# Patient Record
Sex: MALE | Race: WHITE | NOT HISPANIC OR LATINO | Employment: OTHER | RURAL
[De-identification: names, ages, dates, MRNs, and addresses within clinical notes are randomized per-mention and may not be internally consistent; named-entity substitution may affect disease eponyms.]

---

## 2023-02-24 ENCOUNTER — OFFICE VISIT (OUTPATIENT)
Dept: FAMILY MEDICINE CLINIC | Facility: CLINIC | Age: 73
End: 2023-02-24
Payer: MEDICARE

## 2023-02-24 VITALS
RESPIRATION RATE: 18 BRPM | HEIGHT: 67 IN | SYSTOLIC BLOOD PRESSURE: 160 MMHG | DIASTOLIC BLOOD PRESSURE: 98 MMHG | OXYGEN SATURATION: 96 % | BODY MASS INDEX: 32.22 KG/M2 | HEART RATE: 78 BPM | WEIGHT: 205.3 LBS

## 2023-02-24 DIAGNOSIS — E78.2 MIXED HYPERLIPIDEMIA: ICD-10-CM

## 2023-02-24 DIAGNOSIS — G25.0 BENIGN ESSENTIAL TREMOR: ICD-10-CM

## 2023-02-24 DIAGNOSIS — Z12.5 PROSTATE CANCER SCREENING: ICD-10-CM

## 2023-02-24 DIAGNOSIS — R53.83 OTHER FATIGUE: ICD-10-CM

## 2023-02-24 DIAGNOSIS — R73.9 BLOOD GLUCOSE ELEVATED: ICD-10-CM

## 2023-02-24 DIAGNOSIS — Z79.899 ENCOUNTER FOR LONG-TERM (CURRENT) USE OF OTHER MEDICATIONS: ICD-10-CM

## 2023-02-24 DIAGNOSIS — M19.041 PRIMARY OSTEOARTHRITIS OF BOTH HANDS: ICD-10-CM

## 2023-02-24 DIAGNOSIS — M19.042 PRIMARY OSTEOARTHRITIS OF BOTH HANDS: ICD-10-CM

## 2023-02-24 DIAGNOSIS — I10 ESSENTIAL HYPERTENSION, BENIGN: Primary | ICD-10-CM

## 2023-02-24 DIAGNOSIS — Z12.11 COLON CANCER SCREENING: ICD-10-CM

## 2023-02-24 DIAGNOSIS — G56.03 BILATERAL CARPAL TUNNEL SYNDROME: ICD-10-CM

## 2023-02-24 DIAGNOSIS — L82.1 SEBORRHEIC KERATOSIS: ICD-10-CM

## 2023-02-24 PROCEDURE — 36415 COLL VENOUS BLD VENIPUNCTURE: CPT | Performed by: FAMILY MEDICINE

## 2023-02-24 PROCEDURE — 99204 OFFICE O/P NEW MOD 45 MIN: CPT | Performed by: FAMILY MEDICINE

## 2023-02-25 DIAGNOSIS — R97.20 ELEVATED PSA: Primary | ICD-10-CM

## 2023-02-25 PROBLEM — L82.1 SEBORRHEIC KERATOSIS: Status: ACTIVE | Noted: 2023-02-25

## 2023-02-25 PROBLEM — G25.0 BENIGN ESSENTIAL TREMOR: Status: ACTIVE | Noted: 2023-02-25

## 2023-02-25 LAB
ALBUMIN SERPL-MCNC: 4.5 G/DL (ref 3.7–4.7)
ALBUMIN/GLOB SERPL: 1.5 {RATIO} (ref 1.2–2.2)
ALP SERPL-CCNC: 101 IU/L (ref 44–121)
ALT SERPL-CCNC: 23 IU/L (ref 0–44)
AST SERPL-CCNC: 26 IU/L (ref 0–40)
BASOPHILS # BLD AUTO: 0.1 X10E3/UL (ref 0–0.2)
BASOPHILS NFR BLD AUTO: 1 %
BILIRUB SERPL-MCNC: 0.5 MG/DL (ref 0–1.2)
BUN SERPL-MCNC: 17 MG/DL (ref 8–27)
BUN/CREAT SERPL: 16 (ref 10–24)
CALCIUM SERPL-MCNC: 9.5 MG/DL (ref 8.6–10.2)
CHLORIDE SERPL-SCNC: 102 MMOL/L (ref 96–106)
CHOLEST SERPL-MCNC: 213 MG/DL (ref 100–199)
CO2 SERPL-SCNC: 21 MMOL/L (ref 20–29)
CREAT SERPL-MCNC: 1.06 MG/DL (ref 0.76–1.27)
EGFRCR SERPLBLD CKD-EPI 2021: 74 ML/MIN/1.73
EOSINOPHIL # BLD AUTO: 0.2 X10E3/UL (ref 0–0.4)
EOSINOPHIL NFR BLD AUTO: 3 %
ERYTHROCYTE [DISTWIDTH] IN BLOOD BY AUTOMATED COUNT: 12.9 % (ref 11.6–15.4)
GLOBULIN SER CALC-MCNC: 3.1 G/DL (ref 1.5–4.5)
GLUCOSE SERPL-MCNC: 111 MG/DL (ref 70–99)
HBA1C MFR BLD: 5.8 % (ref 4.8–5.6)
HCT VFR BLD AUTO: 50.2 % (ref 37.5–51)
HDLC SERPL-MCNC: 51 MG/DL
HGB BLD-MCNC: 17.5 G/DL (ref 13–17.7)
IMM GRANULOCYTES # BLD AUTO: 0 X10E3/UL (ref 0–0.1)
IMM GRANULOCYTES NFR BLD AUTO: 0 %
LDLC SERPL CALC-MCNC: 129 MG/DL (ref 0–99)
LYMPHOCYTES # BLD AUTO: 2.4 X10E3/UL (ref 0.7–3.1)
LYMPHOCYTES NFR BLD AUTO: 32 %
MCH RBC QN AUTO: 30.6 PG (ref 26.6–33)
MCHC RBC AUTO-ENTMCNC: 34.9 G/DL (ref 31.5–35.7)
MCV RBC AUTO: 88 FL (ref 79–97)
MONOCYTES # BLD AUTO: 0.6 X10E3/UL (ref 0.1–0.9)
MONOCYTES NFR BLD AUTO: 8 %
NEUTROPHILS # BLD AUTO: 4.2 X10E3/UL (ref 1.4–7)
NEUTROPHILS NFR BLD AUTO: 56 %
PLATELET # BLD AUTO: 291 X10E3/UL (ref 150–450)
POTASSIUM SERPL-SCNC: 4.7 MMOL/L (ref 3.5–5.2)
PROT SERPL-MCNC: 7.6 G/DL (ref 6–8.5)
PSA SERPL-MCNC: 27.8 NG/ML (ref 0–4)
RBC # BLD AUTO: 5.72 X10E6/UL (ref 4.14–5.8)
SODIUM SERPL-SCNC: 140 MMOL/L (ref 134–144)
T4 FREE SERPL-MCNC: 1.03 NG/DL (ref 0.82–1.77)
TRIGL SERPL-MCNC: 187 MG/DL (ref 0–149)
TSH SERPL DL<=0.005 MIU/L-ACNC: 3.54 UIU/ML (ref 0.45–4.5)
VLDLC SERPL CALC-MCNC: 33 MG/DL (ref 5–40)
WBC # BLD AUTO: 7.6 X10E3/UL (ref 3.4–10.8)

## 2023-02-26 NOTE — PROGRESS NOTES
Chief Complaint  Establish Care    Subjective      Carlos Simms presents to [facility]  History of Present Illness  Patient is here to establish care and discuss a few concerns that he has.  He said he has not been to see a physician or had a physical in many years.  He moved here from [place] several years ago.  He worked as a respiratory therapist for 48 years.  Since retiring he has continued to be very physically active, enjoying doing construction projects around the home, cycling, and bowling, among other things.  He states he does have a history of essential hypertension, but typically has controlled this with lifestyle modification alone, but admits that he was a bit more sedentary in the colder months and 8 with a little less discretion, and therefore has gained some weight.  He does have some tremor in his hands, mostly when he tries to do something with his hands such as eating or writing, or using utensils.  He has been told by doctors in the past including the neurologist that it was benign essential tremor, but he just wanted to make sure that nothing new was going on.  He does have some atrophy of the muscle at the base of his right thumb, and says that he had trigger thumb surgery on that side many years ago, but the atrophy is only developed in the last year or 2, and the surgery was many years ago.  However, he does have chronic carpal tunnel syndrome symptoms in both hands with some numbness in the fingers, loss of sensation of fingertips, and mild loss of , but not enough that it really impedes his activities.  He does have some osteoarthritis in his hands.  Other than that he has several flat moles on his back that he wants me to check, since he cannot see those.  He does not have any worrisome skin lesions anywhere on the front part of his body, and says that the moles, as he calls them, do not itch bleed or hurt, is just that he cannot see them so he  "wants them checked.  Objective   Vital Signs:   Vitals:    02/24/23 1455 02/24/23 1612   BP: 170/100 160/98   BP Location:  Left arm   Patient Position:  Sitting   Cuff Size:  Adult   Pulse: 78    Resp: 18    SpO2: 96%    Weight: 93.1 kg (205 lb 4.8 oz)    Height: 170.2 cm (67\")       /98 (BP Location: Left arm, Patient Position: Sitting, Cuff Size: Adult)   Pulse 78   Resp 18   Ht 170.2 cm (67\")   Wt 93.1 kg (205 lb 4.8 oz)   SpO2 96%   BMI 32.15 kg/m²     Body mass index is 32.15 kg/m².    Review of Systems   Constitutional: Negative for activity change, appetite change, chills, fever, unexpected weight gain and unexpected weight loss.   HENT: Negative for ear discharge, ear pain, mouth sores, nosebleeds, rhinorrhea, sinus pressure, sore throat, swollen glands, trouble swallowing and voice change.    Eyes: Negative for blurred vision, double vision, pain, redness and visual disturbance.   Respiratory: Negative for cough, chest tightness, shortness of breath and wheezing.    Cardiovascular: Negative for chest pain, palpitations and leg swelling.        PND, orthopnea   Gastrointestinal: Negative for abdominal distention, abdominal pain, blood in stool, constipation, diarrhea, nausea, vomiting and GERD.        Dysphagia, odynophagia   Endocrine: Negative for polydipsia, polyphagia and polyuria.   Genitourinary: Positive for nocturia. Negative for dysuria, hematuria, urgency and urinary incontinence.   Musculoskeletal: Positive for arthralgias (Mild arthritis symptoms in hands). Negative for back pain, gait problem, joint swelling, myalgias and neck pain.   Skin: Negative for rash, skin lesions (worrisome/suspicious), wound and bruise.   Allergic/Immunologic: Negative for food allergies.   Neurological: Positive for tremors (Benign essential tremor symptoms, intention tremor) and numbness (Carpal tunnel syndrome symptoms in both hands, chronic). Negative for dizziness, seizures, syncope, facial asymmetry, " speech difficulty, weakness, light-headedness, headache and memory problem.   Hematological: Negative for adenopathy. Does not bruise/bleed easily.   Psychiatric/Behavioral: Negative for sleep disturbance, suicidal ideas and depressed mood. The patient is not nervous/anxious.        Past History:  Medical History: has a past medical history of Benign essential tremor, Carpal tunnel syndrome on both sides, Essential hypertension, and Osteoarthritis.   Surgical History: has a past surgical history that includes Vasectomy; Vasectomy reversal; and Trigger finger release (Right).   Family History: family history includes Diabetes type II in his brother; Hypertension in his brother; Leukemia (age of onset: 57) in his sister; Liver cancer (age of onset: 65) in his father; No Known Problems in his mother.   Social History: reports that he has quit smoking. His smoking use included cigarettes. He has never used smokeless tobacco. He reports that he does not drink alcohol and does not use drugs.    No current outpatient medications on file.    Allergies: Latex    Physical Exam  Constitutional:       General: He is not in acute distress.     Appearance: He is obese. He is not ill-appearing or toxic-appearing.   HENT:      Head: Normocephalic and atraumatic.      Right Ear: Tympanic membrane, ear canal and external ear normal.      Left Ear: Tympanic membrane, ear canal and external ear normal.      Nose: Nose normal.      Mouth/Throat:      Mouth: Mucous membranes are moist.      Pharynx: Oropharynx is clear.   Eyes:      General: No scleral icterus.     Extraocular Movements: Extraocular movements intact.      Conjunctiva/sclera: Conjunctivae normal.      Pupils: Pupils are equal, round, and reactive to light.   Neck:      Vascular: No carotid bruit.   Cardiovascular:      Rate and Rhythm: Normal rate and regular rhythm.      Pulses: Normal pulses.      Heart sounds: Normal heart sounds.   Pulmonary:      Effort: Pulmonary  effort is normal.      Breath sounds: Normal breath sounds.   Chest:      Chest wall: No tenderness.   Abdominal:      General: Bowel sounds are normal. There is no distension.      Palpations: Abdomen is soft. There is no mass.      Tenderness: There is no abdominal tenderness. There is no guarding or rebound.   Musculoskeletal:         General: Deformity (Moderate osteoarthritis findings in both hands, and atrophy of the right thenar eminence) present. No swelling or tenderness. Normal range of motion.      Cervical back: Normal range of motion. No rigidity or tenderness.      Right lower leg: No edema.      Left lower leg: No edema.   Lymphadenopathy:      Cervical: No cervical adenopathy.   Skin:     General: Skin is warm and dry.      Capillary Refill: Capillary refill takes less than 2 seconds.      Coloration: Skin is not pale.      Findings: Lesion (Scattered benign-appearing seborrheic keratoses on his back, all are symmetric, homogeneous, rough, and brown or gray) present. No erythema or rash.   Neurological:      General: No focal deficit present.      Mental Status: He is alert and oriented to person, place, and time.      Cranial Nerves: No cranial nerve deficit, dysarthria or facial asymmetry.      Sensory: Sensory deficit (Fingertips) present.      Motor: Tremor (Intention tremor in hands) and atrophy (Right thenar eminence only) present. No weakness, abnormal muscle tone, seizure activity or pronator drift.      Coordination: Coordination is intact. Romberg sign negative. Coordination normal. Finger-Nose-Finger Test normal.      Gait: Gait normal.      Deep Tendon Reflexes: Reflexes are normal and symmetric. Reflexes normal.   Psychiatric:         Mood and Affect: Mood normal.         Behavior: Behavior normal.         Judgment: Judgment normal.                   Assessment and Plan   Diagnoses and all orders for this visit:    1. Essential hypertension, benign (Primary)  Patient and I discussed the  diagnosis of hypertension, and the fact that cardiology likes the blood pressure below 130/80, but most other doctors believe below 140/90 is adequate.  Nonetheless, he will work on lifestyle modification and monitor his blood pressure carefully over the next month or 2, and if it remains over 140/90 often, he may need to start medication.  He may or may not be able to bring it down into the desirable range with lifestyle modification alone, and if blood pressure remains 160/100 or higher, then medication should be started right away, as we can always back off if he brings it down naturally.  He understands and will monitor his blood pressure and keep me posted.  We will check some labs today.  2. Encounter for long-term (current) use of other medications  -     CBC & Differential; Future  -     Comprehensive Metabolic Panel; Future  -     CBC & Differential  -     Comprehensive Metabolic Panel    3. Mixed hyperlipidemia  -     Lipid Panel; Future  -     Lipid Panel    4. 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  However, he is only 72 and very vigorous and physically active, and has been in very good health.  Nonetheless, I told him the option was his, and he is agreeable with doing the PSA, and we will notify him of results.  He realizes the test is not full proof  5. Colon cancer screening  -     Cologuard - Stool, Per Rectum; Future  Patient is long overdue for colon cancer screening, but is never had any polyps and has no family history of colon cancer or colon polyps, so pros and cons of colonoscopy versus Cologuard testing were discussed, and he wishes to do the Cologuard  6. Other fatigue  -     TSH+Free T4; Future  -     TSH+Free T4  Patient actually says he feels well and just mentioning did not have  the energy that he used to, as expected for someone 72, but we will check thyroid function studies as a precaution, especially since he has carpal tunnel syndrome 7. Blood glucose elevated  -     Hemoglobin A1c; Future  -     Hemoglobin A1c  We actually do not have any old lab records, and I thought the patient mentioned that his blood sugar may have been a little high 1 time in the past and nonfasting state, but we will check it along with an A1c as a precaution  8. Bilateral carpal tunnel syndrome  Management was discussed, patient already has evidence of thenar atrophy on the right hand which is probably due to this, along with some degree of permanent sensory loss in his fingertips.  Nonetheless, he said he really does not wish to pursue any surgery or aggressive interventions at this time, but will try using night splints for the next month or 2, and if symptoms become more problematic then he will contact me for referral to a hand specialist.  9. Primary osteoarthritis of both hands    10. Seborrheic keratosis  I do not see any worrisome skin lesions now, and we discussed seborrheic keratoses, but all the lesions should be monitored if any of them begin to hurt itch or bleed or change in color or size that he must let us know  11.  Benign essential tremor-patient has had this for some time, and it really does not bother him significantly.  I do not see any signs of other neurologic disorder currently, and he has very good balance, normal heel-to-toe walking normal finger-nose testing negative Romberg and can rise up from the chair and walk promptly without any assistance and without using his arms.  If he begins developing a tremor at rest or impairment of balance or other worrisome signs or symptoms he will let us know.  He does not wish to be treated for his benign essential tremor at this time.        Follow Up   No follow-ups on file.  Patient was given instructions and counseling regarding his condition  or for health maintenance advice. Please see specific information pulled into the AVS if appropriate.     Stevan Keane MD

## 2023-02-27 ENCOUNTER — TELEPHONE (OUTPATIENT)
Dept: FAMILY MEDICINE CLINIC | Facility: CLINIC | Age: 73
End: 2023-02-27

## 2023-02-27 NOTE — TELEPHONE ENCOUNTER
Tried to call pt to discuss elevated PSA,, wife said he was gone to work and she would have him call the office back.

## 2023-02-28 ENCOUNTER — TELEPHONE (OUTPATIENT)
Dept: FAMILY MEDICINE CLINIC | Facility: CLINIC | Age: 73
End: 2023-02-28

## 2024-03-20 ENCOUNTER — OFFICE VISIT (OUTPATIENT)
Dept: FAMILY MEDICINE CLINIC | Facility: CLINIC | Age: 74
End: 2024-03-20
Payer: MEDICARE

## 2024-03-20 VITALS
HEIGHT: 68 IN | SYSTOLIC BLOOD PRESSURE: 184 MMHG | OXYGEN SATURATION: 95 % | BODY MASS INDEX: 31.69 KG/M2 | HEART RATE: 110 BPM | DIASTOLIC BLOOD PRESSURE: 88 MMHG | WEIGHT: 209.1 LBS

## 2024-03-20 DIAGNOSIS — I10 PRIMARY HYPERTENSION: Primary | ICD-10-CM

## 2024-03-20 DIAGNOSIS — L03.116 CELLULITIS OF LEFT LOWER EXTREMITY: ICD-10-CM

## 2024-03-20 DIAGNOSIS — B35.9 TINEA: ICD-10-CM

## 2024-03-20 PROCEDURE — 99214 OFFICE O/P EST MOD 30 MIN: CPT | Performed by: NURSE PRACTITIONER

## 2024-03-20 PROCEDURE — 1160F RVW MEDS BY RX/DR IN RCRD: CPT | Performed by: NURSE PRACTITIONER

## 2024-03-20 PROCEDURE — 3077F SYST BP >= 140 MM HG: CPT | Performed by: NURSE PRACTITIONER

## 2024-03-20 PROCEDURE — 1159F MED LIST DOCD IN RCRD: CPT | Performed by: NURSE PRACTITIONER

## 2024-03-20 PROCEDURE — 3079F DIAST BP 80-89 MM HG: CPT | Performed by: NURSE PRACTITIONER

## 2024-03-20 RX ORDER — PRENATAL VIT 91/IRON/FOLIC/DHA 28-975-200
1 COMBINATION PACKAGE (EA) ORAL 2 TIMES DAILY
Qty: 30 G | Refills: 0 | Status: SHIPPED | OUTPATIENT
Start: 2024-03-20 | End: 2024-03-20 | Stop reason: SDUPTHER

## 2024-03-20 RX ORDER — LOSARTAN POTASSIUM 50 MG/1
50 TABLET ORAL DAILY
Qty: 30 TABLET | Refills: 1 | Status: SHIPPED | OUTPATIENT
Start: 2024-03-20

## 2024-03-20 RX ORDER — LOSARTAN POTASSIUM 50 MG/1
50 TABLET ORAL DAILY
Qty: 30 TABLET | Refills: 1 | Status: SHIPPED | OUTPATIENT
Start: 2024-03-20 | End: 2024-03-20 | Stop reason: SDUPTHER

## 2024-03-20 RX ORDER — AMOXICILLIN AND CLAVULANATE POTASSIUM 875; 125 MG/1; MG/1
1 TABLET, FILM COATED ORAL 2 TIMES DAILY
Qty: 20 TABLET | Refills: 0 | Status: SHIPPED | OUTPATIENT
Start: 2024-03-20 | End: 2024-03-20 | Stop reason: SDUPTHER

## 2024-03-20 RX ORDER — AMOXICILLIN AND CLAVULANATE POTASSIUM 875; 125 MG/1; MG/1
1 TABLET, FILM COATED ORAL 2 TIMES DAILY
Qty: 20 TABLET | Refills: 0 | Status: SHIPPED | OUTPATIENT
Start: 2024-03-20

## 2024-03-20 NOTE — PROGRESS NOTES
"Chief Complaint  redness left foot  (For 2 days tender red streaks going up leg )    Subjective          Carlos Simms presents to [facility]  History of Present Illness  Pt has had redness, swelling, and tenderness in his left food since yesterday. He noticed streaking up his leg this morning. He has had elevated blood pressure for years and has tried a few different medications. He has had a skin lesion between his toes for a while.       Objective   Vital Signs:   BP (!) 184/88 (BP Location: Left arm, Patient Position: Sitting, Cuff Size: Adult)   Pulse 110   Ht 171.5 cm (67.5\")   Wt 94.8 kg (209 lb 1.6 oz)   SpO2 95%   BMI 32.27 kg/m²     Body mass index is 32.27 kg/m².    Review of Systems   Constitutional:  Negative for fatigue and fever.   Respiratory:  Negative for shortness of breath.    Cardiovascular:  Negative for chest pain, palpitations and leg swelling.   Skin:  Positive for wound.   Neurological:  Negative for syncope.   Psychiatric/Behavioral:  The patient is not nervous/anxious.           Current Outpatient Medications:     amoxicillin-clavulanate (AUGMENTIN) 875-125 MG per tablet, Take 1 tablet by mouth 2 (Two) Times a Day., Disp: 20 tablet, Rfl: 0    losartan (Cozaar) 50 MG tablet, Take 1 tablet by mouth Daily., Disp: 30 tablet, Rfl: 1    terbinafine (LamISIL AT) 1 % cream, Apply 1 Application topically to the appropriate area as directed 2 (Two) Times a Day., Disp: 30 g, Rfl: 0      Allergies: Latex    Physical Exam  Constitutional:       Appearance: Normal appearance.   HENT:      Head: Normocephalic.   Eyes:      Conjunctiva/sclera: Conjunctivae normal.      Pupils: Pupils are equal, round, and reactive to light.   Cardiovascular:      Rate and Rhythm: Normal rate and regular rhythm.      Heart sounds: Normal heart sounds.   Pulmonary:      Effort: Pulmonary effort is normal.      Breath sounds: Normal breath sounds.   Abdominal:      Tenderness: There " is no abdominal tenderness.   Musculoskeletal:         General: Normal range of motion.   Skin:     General: Skin is warm and dry.      Capillary Refill: Capillary refill takes less than 2 seconds.      Comments: Erythema/edema left foot with erythematous streaks on lower leg. Erythematous area left thigh   Neurological:      General: No focal deficit present.      Mental Status: He is alert and oriented to person, place, and time.   Psychiatric:         Mood and Affect: Mood normal.         Behavior: Behavior normal.         Thought Content: Thought content normal.         Judgment: Judgment normal.          Result Review :                   Assessment and Plan    Diagnoses and all orders for this visit:    1. Primary hypertension (Primary)  Comments:  Begin Losartan. RTC for physical in 1 month and to follow up on BP. Monitor BP and keep log.  Orders:  -     losartan (Cozaar) 50 MG tablet; Take 1 tablet by mouth Daily.  Dispense: 30 tablet; Refill: 1    2. Tinea  Comments:  Lamisil cream and treat shoes.  Orders:  -     terbinafine (LamISIL AT) 1 % cream; Apply 1 Application topically to the appropriate area as directed 2 (Two) Times a Day.  Dispense: 30 g; Refill: 0    3. Cellulitis of left lower extremity  Comments:  Finish antibiotics. Elevate leg when possible. Go to the ER for worsened sx. RTC if not improving in 1 week.  Orders:  -     amoxicillin-clavulanate (AUGMENTIN) 875-125 MG per tablet; Take 1 tablet by mouth 2 (Two) Times a Day.  Dispense: 20 tablet; Refill: 0    Other orders  -     Discontinue: losartan (Cozaar) 50 MG tablet; Take 1 tablet by mouth Daily.  Dispense: 30 tablet; Refill: 1  -     Discontinue: terbinafine (LamISIL AT) 1 % cream; Apply 1 Application topically to the appropriate area as directed 2 (Two) Times a Day.  Dispense: 30 g; Refill: 0  -     Discontinue: amoxicillin-clavulanate (AUGMENTIN) 875-125 MG per tablet; Take 1 tablet by mouth 2 (Two) Times a Day.  Dispense: 20 tablet;  Refill: 0                Follow Up   Return in about 1 week (around 3/27/2024) for if not improving or sooner if symptoms worsen.  Patient was given instructions and counseling regarding his condition or for health maintenance advice. Please see specific information pulled into the AVS if appropriate.     KATI Perez

## 2024-05-14 DIAGNOSIS — I10 PRIMARY HYPERTENSION: ICD-10-CM

## 2024-05-14 RX ORDER — LOSARTAN POTASSIUM 50 MG/1
50 TABLET ORAL DAILY
Qty: 30 TABLET | Refills: 0 | Status: SHIPPED | OUTPATIENT
Start: 2024-05-14

## 2024-05-15 NOTE — TELEPHONE ENCOUNTER
Hub relay: left message patient's medication has been refilled and they need to schedule a medication recheck with Dr Keane

## 2025-04-08 ENCOUNTER — OFFICE VISIT (OUTPATIENT)
Dept: FAMILY MEDICINE CLINIC | Facility: CLINIC | Age: 75
End: 2025-04-08
Payer: MEDICARE

## 2025-04-08 ENCOUNTER — APPOINTMENT (OUTPATIENT)
Dept: GENERAL RADIOLOGY | Facility: HOSPITAL | Age: 75
DRG: 603 | End: 2025-04-08
Payer: MEDICARE

## 2025-04-08 ENCOUNTER — HOSPITAL ENCOUNTER (INPATIENT)
Facility: HOSPITAL | Age: 75
LOS: 2 days | Discharge: HOME | DRG: 603 | End: 2025-04-11
Attending: EMERGENCY MEDICINE | Admitting: FAMILY MEDICINE
Payer: MEDICARE

## 2025-04-08 VITALS
OXYGEN SATURATION: 96 % | TEMPERATURE: 99 F | BODY MASS INDEX: 30.67 KG/M2 | DIASTOLIC BLOOD PRESSURE: 82 MMHG | HEIGHT: 67 IN | HEART RATE: 88 BPM | SYSTOLIC BLOOD PRESSURE: 162 MMHG | WEIGHT: 195.4 LBS

## 2025-04-08 DIAGNOSIS — L03.116 CELLULITIS OF LEFT LOWER EXTREMITY: Primary | ICD-10-CM

## 2025-04-08 DIAGNOSIS — B35.3 TINEA PEDIS OF LEFT FOOT: ICD-10-CM

## 2025-04-08 DIAGNOSIS — M79.662 PAIN AND SWELLING OF LEFT LOWER LEG: ICD-10-CM

## 2025-04-08 DIAGNOSIS — I10 ESSENTIAL HYPERTENSION, BENIGN: ICD-10-CM

## 2025-04-08 DIAGNOSIS — Z78.9 FAILURE OF OUTPATIENT TREATMENT: ICD-10-CM

## 2025-04-08 DIAGNOSIS — M79.89 PAIN AND SWELLING OF LEFT LOWER LEG: ICD-10-CM

## 2025-04-08 PROBLEM — L03.90 CELLULITIS: Status: ACTIVE | Noted: 2025-04-08

## 2025-04-08 LAB
ALBUMIN SERPL-MCNC: 3.8 G/DL (ref 3.5–5.2)
ALBUMIN/GLOB SERPL: 0.9 G/DL
ALP SERPL-CCNC: 106 U/L (ref 39–117)
ALT SERPL W P-5'-P-CCNC: 17 U/L (ref 1–41)
ANION GAP SERPL CALCULATED.3IONS-SCNC: 10 MMOL/L (ref 5–15)
AST SERPL-CCNC: 22 U/L (ref 1–40)
BASOPHILS # BLD AUTO: 0.08 10*3/MM3 (ref 0–0.2)
BASOPHILS NFR BLD AUTO: 1 % (ref 0–1.5)
BILIRUB SERPL-MCNC: 0.4 MG/DL (ref 0–1.2)
BILIRUB UR QL STRIP: NEGATIVE
BUN SERPL-MCNC: 20 MG/DL (ref 8–23)
BUN/CREAT SERPL: 17.4 (ref 7–25)
CALCIUM SPEC-SCNC: 9.5 MG/DL (ref 8.6–10.5)
CHLORIDE SERPL-SCNC: 101 MMOL/L (ref 98–107)
CK SERPL-CCNC: 98 U/L (ref 20–200)
CLARITY UR: CLEAR
CO2 SERPL-SCNC: 26 MMOL/L (ref 22–29)
COLOR UR: YELLOW
CREAT SERPL-MCNC: 1.15 MG/DL (ref 0.76–1.27)
CRP SERPL-MCNC: 2.12 MG/DL (ref 0–0.5)
D DIMER PPP FEU-MCNC: 2 MCGFEU/ML (ref 0–0.75)
D-LACTATE SERPL-SCNC: 1.4 MMOL/L (ref 0.5–2)
DEPRECATED RDW RBC AUTO: 42.5 FL (ref 37–54)
EGFRCR SERPLBLD CKD-EPI 2021: 66.4 ML/MIN/1.73
EOSINOPHIL # BLD AUTO: 0.23 10*3/MM3 (ref 0–0.4)
EOSINOPHIL NFR BLD AUTO: 2.8 % (ref 0.3–6.2)
ERYTHROCYTE [DISTWIDTH] IN BLOOD BY AUTOMATED COUNT: 12.9 % (ref 12.3–15.4)
ERYTHROCYTE [SEDIMENTATION RATE] IN BLOOD: 82 MM/HR (ref 0–20)
GLOBULIN UR ELPH-MCNC: 4.2 GM/DL
GLUCOSE SERPL-MCNC: 103 MG/DL (ref 65–99)
GLUCOSE UR STRIP-MCNC: NEGATIVE MG/DL
HCT VFR BLD AUTO: 41.1 % (ref 37.5–51)
HGB BLD-MCNC: 14.1 G/DL (ref 13–17.7)
HGB UR QL STRIP.AUTO: NEGATIVE
IMM GRANULOCYTES # BLD AUTO: 0.04 10*3/MM3 (ref 0–0.05)
IMM GRANULOCYTES NFR BLD AUTO: 0.5 % (ref 0–0.5)
KETONES UR QL STRIP: NEGATIVE
LEUKOCYTE ESTERASE UR QL STRIP.AUTO: NEGATIVE
LYMPHOCYTES # BLD AUTO: 2.31 10*3/MM3 (ref 0.7–3.1)
LYMPHOCYTES NFR BLD AUTO: 27.9 % (ref 19.6–45.3)
MCH RBC QN AUTO: 31.1 PG (ref 26.6–33)
MCHC RBC AUTO-ENTMCNC: 34.3 G/DL (ref 31.5–35.7)
MCV RBC AUTO: 90.5 FL (ref 79–97)
MONOCYTES # BLD AUTO: 0.69 10*3/MM3 (ref 0.1–0.9)
MONOCYTES NFR BLD AUTO: 8.3 % (ref 5–12)
NEUTROPHILS NFR BLD AUTO: 4.92 10*3/MM3 (ref 1.7–7)
NEUTROPHILS NFR BLD AUTO: 59.5 % (ref 42.7–76)
NITRITE UR QL STRIP: NEGATIVE
NRBC BLD AUTO-RTO: 0 /100 WBC (ref 0–0.2)
NT-PROBNP SERPL-MCNC: 246.4 PG/ML (ref 0–1800)
PH UR STRIP.AUTO: 7.5 [PH] (ref 5–8)
PLATELET # BLD AUTO: 348 10*3/MM3 (ref 140–450)
PMV BLD AUTO: 9.3 FL (ref 6–12)
POTASSIUM SERPL-SCNC: 4.8 MMOL/L (ref 3.5–5.2)
PROCALCITONIN SERPL-MCNC: 0.06 NG/ML (ref 0–0.25)
PROT SERPL-MCNC: 8 G/DL (ref 6–8.5)
PROT UR QL STRIP: NEGATIVE
RBC # BLD AUTO: 4.54 10*6/MM3 (ref 4.14–5.8)
SODIUM SERPL-SCNC: 137 MMOL/L (ref 136–145)
SP GR UR STRIP: 1.01 (ref 1–1.03)
UROBILINOGEN UR QL STRIP: NORMAL
WBC NRBC COR # BLD AUTO: 8.27 10*3/MM3 (ref 3.4–10.8)

## 2025-04-08 PROCEDURE — G0378 HOSPITAL OBSERVATION PER HR: HCPCS

## 2025-04-08 PROCEDURE — 83605 ASSAY OF LACTIC ACID: CPT | Performed by: EMERGENCY MEDICINE

## 2025-04-08 PROCEDURE — 36415 COLL VENOUS BLD VENIPUNCTURE: CPT

## 2025-04-08 PROCEDURE — 85652 RBC SED RATE AUTOMATED: CPT | Performed by: EMERGENCY MEDICINE

## 2025-04-08 PROCEDURE — 93010 ELECTROCARDIOGRAM REPORT: CPT | Performed by: INTERNAL MEDICINE

## 2025-04-08 PROCEDURE — 85025 COMPLETE CBC W/AUTO DIFF WBC: CPT | Performed by: EMERGENCY MEDICINE

## 2025-04-08 PROCEDURE — 85379 FIBRIN DEGRADATION QUANT: CPT | Performed by: NURSE PRACTITIONER

## 2025-04-08 PROCEDURE — 81003 URINALYSIS AUTO W/O SCOPE: CPT | Performed by: NURSE PRACTITIONER

## 2025-04-08 PROCEDURE — 83880 ASSAY OF NATRIURETIC PEPTIDE: CPT | Performed by: EMERGENCY MEDICINE

## 2025-04-08 PROCEDURE — 80053 COMPREHEN METABOLIC PANEL: CPT | Performed by: EMERGENCY MEDICINE

## 2025-04-08 PROCEDURE — 73590 X-RAY EXAM OF LOWER LEG: CPT

## 2025-04-08 PROCEDURE — 82550 ASSAY OF CK (CPK): CPT | Performed by: EMERGENCY MEDICINE

## 2025-04-08 PROCEDURE — 73630 X-RAY EXAM OF FOOT: CPT

## 2025-04-08 PROCEDURE — 87040 BLOOD CULTURE FOR BACTERIA: CPT | Performed by: EMERGENCY MEDICINE

## 2025-04-08 PROCEDURE — 86140 C-REACTIVE PROTEIN: CPT | Performed by: EMERGENCY MEDICINE

## 2025-04-08 PROCEDURE — 84145 PROCALCITONIN (PCT): CPT | Performed by: EMERGENCY MEDICINE

## 2025-04-08 PROCEDURE — 99285 EMERGENCY DEPT VISIT HI MDM: CPT

## 2025-04-08 PROCEDURE — 25010000002 DAPTOMYCIN PER 1 MG

## 2025-04-08 PROCEDURE — 25010000002 CEFTRIAXONE PER 250 MG: Performed by: EMERGENCY MEDICINE

## 2025-04-08 PROCEDURE — 93005 ELECTROCARDIOGRAM TRACING: CPT | Performed by: NURSE PRACTITIONER

## 2025-04-08 PROCEDURE — 99222 1ST HOSP IP/OBS MODERATE 55: CPT | Performed by: NURSE PRACTITIONER

## 2025-04-08 RX ORDER — AMLODIPINE BESYLATE 5 MG/1
5 TABLET ORAL DAILY
COMMUNITY

## 2025-04-08 RX ORDER — SODIUM CHLORIDE 9 MG/ML
40 INJECTION, SOLUTION INTRAVENOUS AS NEEDED
Status: DISCONTINUED | OUTPATIENT
Start: 2025-04-08 | End: 2025-04-11 | Stop reason: HOSPADM

## 2025-04-08 RX ORDER — FUROSEMIDE 40 MG/1
40 TABLET ORAL DAILY
Status: DISCONTINUED | OUTPATIENT
Start: 2025-04-09 | End: 2025-04-11 | Stop reason: HOSPADM

## 2025-04-08 RX ORDER — SODIUM CHLORIDE 0.9 % (FLUSH) 0.9 %
10 SYRINGE (ML) INJECTION AS NEEDED
Status: DISCONTINUED | OUTPATIENT
Start: 2025-04-08 | End: 2025-04-11 | Stop reason: HOSPADM

## 2025-04-08 RX ORDER — LISINOPRIL 20 MG/1
40 TABLET ORAL DAILY
Status: DISCONTINUED | OUTPATIENT
Start: 2025-04-09 | End: 2025-04-11 | Stop reason: HOSPADM

## 2025-04-08 RX ORDER — ENOXAPARIN SODIUM 100 MG/ML
40 INJECTION SUBCUTANEOUS DAILY
Status: DISCONTINUED | OUTPATIENT
Start: 2025-04-09 | End: 2025-04-11 | Stop reason: HOSPADM

## 2025-04-08 RX ORDER — LISINOPRIL 40 MG/1
40 TABLET ORAL DAILY
COMMUNITY

## 2025-04-08 RX ORDER — METRONIDAZOLE 500 MG/1
500 TABLET ORAL ONCE
Status: DISCONTINUED | OUTPATIENT
Start: 2025-04-08 | End: 2025-04-08

## 2025-04-08 RX ORDER — AMOXICILLIN 250 MG
2 CAPSULE ORAL 2 TIMES DAILY PRN
Status: DISCONTINUED | OUTPATIENT
Start: 2025-04-08 | End: 2025-04-11 | Stop reason: HOSPADM

## 2025-04-08 RX ORDER — VANCOMYCIN 1.75 GRAM/500 ML IN 0.9 % SODIUM CHLORIDE INTRAVENOUS
20 ONCE
Status: DISCONTINUED | OUTPATIENT
Start: 2025-04-08 | End: 2025-04-08

## 2025-04-08 RX ORDER — AMLODIPINE BESYLATE 5 MG/1
5 TABLET ORAL DAILY
Status: DISCONTINUED | OUTPATIENT
Start: 2025-04-09 | End: 2025-04-11 | Stop reason: HOSPADM

## 2025-04-08 RX ORDER — ACETAMINOPHEN 325 MG/1
650 TABLET ORAL EVERY 4 HOURS PRN
Status: DISCONTINUED | OUTPATIENT
Start: 2025-04-08 | End: 2025-04-11 | Stop reason: HOSPADM

## 2025-04-08 RX ORDER — BISACODYL 10 MG
10 SUPPOSITORY, RECTAL RECTAL DAILY PRN
Status: DISCONTINUED | OUTPATIENT
Start: 2025-04-08 | End: 2025-04-11 | Stop reason: HOSPADM

## 2025-04-08 RX ORDER — SODIUM CHLORIDE 0.9 % (FLUSH) 0.9 %
10 SYRINGE (ML) INJECTION EVERY 12 HOURS SCHEDULED
Status: DISCONTINUED | OUTPATIENT
Start: 2025-04-08 | End: 2025-04-11 | Stop reason: HOSPADM

## 2025-04-08 RX ORDER — FUROSEMIDE 40 MG/1
40 TABLET ORAL DAILY
COMMUNITY

## 2025-04-08 RX ORDER — BISACODYL 5 MG/1
5 TABLET, DELAYED RELEASE ORAL DAILY PRN
Status: DISCONTINUED | OUTPATIENT
Start: 2025-04-08 | End: 2025-04-11 | Stop reason: HOSPADM

## 2025-04-08 RX ORDER — POLYETHYLENE GLYCOL 3350 17 G/17G
17 POWDER, FOR SOLUTION ORAL DAILY PRN
Status: DISCONTINUED | OUTPATIENT
Start: 2025-04-08 | End: 2025-04-11 | Stop reason: HOSPADM

## 2025-04-08 RX ADMIN — DAPTOMYCIN 450 MG: 500 INJECTION, POWDER, LYOPHILIZED, FOR SOLUTION INTRAVENOUS at 22:14

## 2025-04-08 RX ADMIN — Medication 10 ML: at 22:50

## 2025-04-08 RX ADMIN — CEFTRIAXONE 2000 MG: 2 INJECTION, POWDER, FOR SOLUTION INTRAMUSCULAR; INTRAVENOUS at 19:30

## 2025-04-08 NOTE — PROGRESS NOTES
"Chief Complaint  left leg  (Swelling pain redness 3 months taking 2 antibotics )    Subjective      Carlos Simms presents to [facility]  History of Present Illness  Patient was last seen here about 2 years ago, and unfortunately he just experienced a period of incarceration, the details of which were not provided.  He says that while he was in group home he began having some swelling and discomfort in his left leg about 3 months ago.  He has a history of chronic tinea pedis and has had cellulitis in the left foot 1 time approximately 1 year ago which improved significantly with antibiotics and antifungal cream, although it never went away completely.  However, things have been fairly stable until about 3 months ago when he began having significant increased swelling in the left lower extremity, along with some pain.  He denies any chest pain shortness of breath significant fever or chills.  He was treated with an antibiotic, but there was no response so he was given another course of 2 different antibiotics, but unfortunately he cannot recall the names of them, and they were obtained at the group home and not through a local pharmacy so we cannot get the records.  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  About 3 months ago he was also started on Lasix 40 mg daily but it had no effect.  Objective   Vital Signs:   Vitals:    04/08/25 1501   BP: 162/82   BP Location: Left arm   Patient Position: Sitting   Cuff Size: Large Adult   Pulse: 88   Temp: 99 °F (37.2 °C)   TempSrc: Oral   SpO2: 96%   Weight: 88.6 kg (195 lb 6.4 oz)   Height: 170.2 cm (67\")      /82 (BP Location: Left arm, Patient Position: Sitting, Cuff Size: Large Adult)   Pulse 88   Temp 99 °F (37.2 °C) (Oral)   Ht 170.2 cm (67\")   Wt 88.6 kg (195 lb 6.4 oz)   SpO2 " 96%   BMI 30.60 kg/m²     Body mass index is 30.6 kg/m².    Review of Systems   Constitutional:  Negative for chills and fever.   HENT:  Negative for sore throat and swollen glands.    Respiratory:  Negative for cough and shortness of breath.    Cardiovascular:  Positive for leg swelling. Negative for chest pain.   Gastrointestinal:  Negative for abdominal pain, nausea and vomiting.   Genitourinary:  Negative for dysuria and hematuria.   Musculoskeletal:  Negative for arthralgias, back pain, myalgias and neck stiffness.   Skin:  Positive for color change and rash.   Neurological:  Negative for weakness, light-headedness, numbness and headache.   Hematological:  Negative for adenopathy.       Past History:  Medical History: has a past medical history of Benign essential tremor, Carpal tunnel syndrome on both sides, Essential hypertension, and Osteoarthritis.   Surgical History: has a past surgical history that includes Vasectomy; Vasectomy reversal; and Trigger finger release (Right).   Family History: family history includes Diabetes type II in his brother; Hypertension in his brother; Leukemia (age of onset: 57) in his sister; Liver cancer (age of onset: 65) in his father; No Known Problems in his mother.   Social History: reports that he has quit smoking. His smoking use included cigarettes. He has never used smokeless tobacco. He reports that he does not drink alcohol and does not use drugs.      Current Outpatient Medications:     amLODIPine (NORVASC) 5 MG tablet, Take 1 tablet by mouth Daily., Disp: , Rfl:     furosemide (LASIX) 40 MG tablet, Take 1 tablet by mouth Daily., Disp: , Rfl:     lisinopril (PRINIVIL,ZESTRIL) 40 MG tablet, Take 1 tablet by mouth Daily., Disp: , Rfl:     Allergies: Latex    Physical Exam  Constitutional:       General: He is not in acute distress.     Appearance: He is not ill-appearing, toxic-appearing or diaphoretic.   HENT:      Head: Normocephalic.      Right Ear: External ear  normal.      Left Ear: External ear normal.      Nose: Nose normal.      Mouth/Throat:      Mouth: Mucous membranes are moist.      Pharynx: Oropharynx is clear.   Eyes:      Extraocular Movements: Extraocular movements intact.      Conjunctiva/sclera: Conjunctivae normal.      Pupils: Pupils are equal, round, and reactive to light.   Cardiovascular:      Rate and Rhythm: Normal rate and regular rhythm.      Pulses: Normal pulses.      Heart sounds: Normal heart sounds.   Abdominal:      General: There is no distension.      Palpations: There is no mass.      Tenderness: There is no abdominal tenderness.   Musculoskeletal:         General: Tenderness (mild tenderness in the left calf) present.      Cervical back: Normal range of motion.      Right lower leg: Edema present.      Left lower leg: Edema (3+ pitting to the knee) present.   Lymphadenopathy:      Cervical: No cervical adenopathy.   Skin:     General: Skin is warm and dry.      Capillary Refill: Capillary refill takes less than 2 seconds.      Coloration: Skin is not jaundiced or pale.      Findings: Erythema (There is a moderate erythema of the left lower extremity diffusely below the knee with an open wound between the 4th and 5th toes with malodorous discharge) present. No bruising.   Neurological:      General: No focal deficit present.      Mental Status: He is alert and oriented to person, place, and time.      Cranial Nerves: No cranial nerve deficit.      Motor: No weakness.      Gait: Gait abnormal (And gait).   Psychiatric:         Mood and Affect: Mood normal.         Behavior: Behavior normal.         Judgment: Judgment normal.                   Assessment and Plan   Diagnoses and all orders for this visit:    1. Cellulitis of left lower extremity (Primary)  The patient and I discussed that this is very suspicious for incompletely treated cellulitis since there is an open wound between the 4th and 5th toes with malodorous discharge, but the  redness in the leg is in the mild to moderate range with just mild warmth, and he denies any systemic symptoms of infection such as fever or shaking chills.  The clinical appearance and history are more suggestive of a DVT, since he has never had significant problems with venous insufficiency in the past.  Either way, he has failed 2 courses of antibiotics, with the most recent course being dual antibiotic therapy, and the swelling has not responded to 40 mg of Lasix daily which was prescribed 3 months ago.  He does not report any recent trauma to the leg.  Therefore, the patient really needs to go to the emergency department for a prompt and thorough evaluation and he agrees.  His wife is here with him today and they will go to [facility] for evaluation  2. Pain and swelling of left lower leg    3. Tinea pedis of left foot    4. Essential hypertension, benign            Follow Up   No follow-ups on file.  Patient was given instructions and counseling regarding his condition or for health maintenance advice. Please see specific information pulled into the AVS if appropriate.     Stevan Keane MD

## 2025-04-08 NOTE — ED PROVIDER NOTES
" EMERGENCY DEPARTMENT ENCOUNTER    Pt Name: Carlos Simms  MRN: [id number]  Pt :   1950  Room Number:    Date of encounter:  2025  PCP: Stevan Keane MD  ED Provider: Ricky Starr MD    Historian: patient and wife with medical knowledge      HPI:  Chief Complaint: left leg swelling        Context: Carlos Simms is a 74 y.o. male who presents to the ED c/o left leg swelling since the beginning of January of this year.  The swelling has gradually worsened.  The patient has been incarcerated at the senior care and reportedly was placed on 2 different antibiotics, 1 blue capsule and one large white pill, both twice a day dosing and has been on those medications since that time with only roughly 1 week being off of them.  He has also been on Lasix 40 mg a day over that same time period.  The patient reports chronic \"athlete's foot\" ongoing for over a year.  After being released from senior care today the patient went to his PCP but PCP sent to us for further evaluation.  Patient reports running a fever roughly a week to 10 days ago but no fevers over the last several days.    Takes lisinopril 40, Lasix 40, amlodipine 5      PAST MEDICAL HISTORY  Past Medical History:   Diagnosis Date    Benign essential tremor     Carpal tunnel syndrome on both sides     Essential hypertension     Usually controlled with lifestyle efforts    Osteoarthritis          PAST SURGICAL HISTORY  Past Surgical History:   Procedure Laterality Date    TRIGGER FINGER RELEASE Right     Right thumb    VASECTOMY      VASECTOMY REVERSAL           FAMILY HISTORY  Family History   Problem Relation Age of Onset    No Known Problems Mother          in her 90s of \"old age\"    Liver cancer Father 65    Leukemia Sister 57    Diabetes type II Brother     Hypertension Brother         Pulmonary hypertension         SOCIAL HISTORY  Social History     Socioeconomic History    Marital status:    Tobacco Use    Smoking status: Former     " Types: Cigarettes    Smokeless tobacco: Never   Vaping Use    Vaping status: Never Used   Substance and Sexual Activity    Alcohol use: Never    Drug use: Never    Sexual activity: Never         ALLERGIES  Latex        REVIEW OF SYSTEMS  Review of Systems       All systems reviewed and negative except for those discussed in HPI.       PHYSICAL EXAM    I have reviewed the triage vital signs and nursing notes.    ED Triage Vitals [04/08/25 1639]   Temp Heart Rate Resp BP SpO2   98.5 °F (36.9 °C) 69 18 139/97 96 %      Temp src Heart Rate Source Patient Position BP Location FiO2 (%)   Oral Monitor Sitting Left arm --       Physical Exam  GENERAL:   Appears in no acute distress.   HENT: Nares patent.  EYES: No scleral icterus.  CV: Regular rhythm, regular rate.  No murmurs gallops rubs  RESPIRATORY: Normal effort.  No audible wheezes, rales or rhonchi.  ABDOMEN: Soft, nontender  MUSCULOSKELETAL: 3+ somewhat tense pitting edema left foot ankle and lower extremity distal to the knee.  NEURO: Alert, moves all extremities, follows commands.  Fine touch and motor intact throughout the left lower extremity  SKIN: Erythema throughout left lower extremity distal to knee.  See image I took in our triage area, below      LAB RESULTS  Recent Results (from the past 24 hours)   Comprehensive Metabolic Panel    Collection Time: 04/08/25  7:01 PM    Specimen: Blood   Result Value Ref Range    Glucose 103 (H) 65 - 99 mg/dL    BUN 20 8 - 23 mg/dL    Creatinine 1.15 0.76 - 1.27 mg/dL    Sodium 137 136 - 145 mmol/L    Potassium 4.8 3.5 - 5.2 mmol/L    Chloride 101 98 - 107 mmol/L    CO2 26.0 22.0 - 29.0 mmol/L    Calcium 9.5 8.6 - 10.5 mg/dL    Total Protein 8.0 6.0 - 8.5 g/dL    Albumin 3.8 3.5 - 5.2 g/dL    ALT (SGPT) 17 1 - 41 U/L    AST (SGOT) 22 1 - 40 U/L    Alkaline Phosphatase 106 39 - 117 U/L    Total Bilirubin 0.4 0.0 - 1.2 mg/dL    Globulin 4.2 gm/dL    A/G Ratio 0.9 g/dL    BUN/Creatinine Ratio 17.4 7.0 - 25.0    Anion Gap  10.0 5.0 - 15.0 mmol/L    eGFR 66.4 >60.0 mL/min/1.73   Lactic Acid, Plasma    Collection Time: 04/08/25  7:01 PM    Specimen: Blood   Result Value Ref Range    Lactate 1.4 0.5 - 2.0 mmol/L   Procalcitonin    Collection Time: 04/08/25  7:01 PM    Specimen: Blood   Result Value Ref Range    Procalcitonin 0.06 0.00 - 0.25 ng/mL   CK    Collection Time: 04/08/25  7:01 PM    Specimen: Blood   Result Value Ref Range    Creatine Kinase 98 20 - 200 U/L   Sedimentation Rate    Collection Time: 04/08/25  7:01 PM    Specimen: Blood   Result Value Ref Range    Sed Rate 82 (H) 0 - 20 mm/hr   C-reactive Protein    Collection Time: 04/08/25  7:01 PM    Specimen: Blood   Result Value Ref Range    C-Reactive Protein 2.12 (H) 0.00 - 0.50 mg/dL   CBC Auto Differential    Collection Time: 04/08/25  7:01 PM    Specimen: Blood   Result Value Ref Range    WBC 8.27 3.40 - 10.80 10*3/mm3    RBC 4.54 4.14 - 5.80 10*6/mm3    Hemoglobin 14.1 13.0 - 17.7 g/dL    Hematocrit 41.1 37.5 - 51.0 %    MCV 90.5 79.0 - 97.0 fL    MCH 31.1 26.6 - 33.0 pg    MCHC 34.3 31.5 - 35.7 g/dL    RDW 12.9 12.3 - 15.4 %    RDW-SD 42.5 37.0 - 54.0 fl    MPV 9.3 6.0 - 12.0 fL    Platelets 348 140 - 450 10*3/mm3    Neutrophil % 59.5 42.7 - 76.0 %    Lymphocyte % 27.9 19.6 - 45.3 %    Monocyte % 8.3 5.0 - 12.0 %    Eosinophil % 2.8 0.3 - 6.2 %    Basophil % 1.0 0.0 - 1.5 %    Immature Grans % 0.5 0.0 - 0.5 %    Neutrophils, Absolute 4.92 1.70 - 7.00 10*3/mm3    Lymphocytes, Absolute 2.31 0.70 - 3.10 10*3/mm3    Monocytes, Absolute 0.69 0.10 - 0.90 10*3/mm3    Eosinophils, Absolute 0.23 0.00 - 0.40 10*3/mm3    Basophils, Absolute 0.08 0.00 - 0.20 10*3/mm3    Immature Grans, Absolute 0.04 0.00 - 0.05 10*3/mm3    nRBC 0.0 0.0 - 0.2 /100 WBC   BNP    Collection Time: 04/08/25  7:01 PM    Specimen: Blood   Result Value Ref Range    proBNP 246.4 0.0 - 1,800.0 pg/mL       If labs were ordered, I independently reviewed the results and considered them in treating the  patient.        RADIOLOGY  XR Foot 3+ View Left  Result Date: 4/8/2025  XR FOOT 3+ VW LEFT Date of Exam: 4/8/2025 7:34 PM EDT Indication: Consider foreign body with cellulitis Comparison: None available. Findings: There is no radiographic evidence of acute fracture or dislocation. Joint spaces are preserved. There is marked diffuse left shoulder soft tissue swelling. No radiopaque foreign bodies visualized.     Impression: Marked diffuse left shoulder soft tissue swelling. Correlate for cellulitis. No radiopaque foreign bodies visualized. No acute fracture. Electronically Signed: Adrian Blackwood MD  4/8/2025 8:09 PM EDT  Workstation ID: [id number]    XR Tibia Fibula 2 View Left  Result Date: 4/8/2025  XR TIBIA FIBULA 2 VW LEFT Date of Exam: 4/8/2025 7:34 PM EDT Indication: Consider foreign body with lower extremity cellulitis Comparison: None available. Findings: There is no radiographic evidence of acute fracture or dislocation. Joint spaces are preserved. Diffuse soft tissue swelling around the left tibia and fibula is visualized. No radiopaque foreign bodies visualized.     Impression: Diffuse soft tissue swelling around the left tibia and fibula. Correlate for cellulitis. No radiopaque foreign bodies visualized. No acute fracture. Electronically Signed: Adrian Blackwood MD  4/8/2025 8:08 PM EDT  Workstation ID: [id number]      I ordered and independently reviewed the above noted radiographic studies.      I viewed images of left foot and tibia/fibula radiographs, multiple views which showed no foreign bodies but marked soft tissue edema per my independent interpretation.    See radiologist's dictation for official interpretation.        PROCEDURES    Procedures    No orders to display       MEDICATIONS GIVEN IN ER    Medications   DAPTOmycin (CUBICIN) 450 mg in sodium chloride 0.9 % 50 mL IVPB (has no administration in time range)   cefTRIAXone (ROCEPHIN) 2,000 mg in sodium chloride 0.9 % 100 mL MBP (0 mg Intravenous  Stopped 4/8/25 2015)         MEDICAL DECISION MAKING, PROGRESS, and CONSULTS    All labs, if obtained, have been independently reviewed by me.  All radiology studies, if obtained, have been reviewed by me and the radiologist dictating the report.  All EKGs, if obtained, have been independently viewed and interpreted by me/my attending physician.      Discussion below represents my analysis of pertinent findings related to patient's condition, differential diagnosis, treatment plan and final disposition.                                          Differential diagnosis:    Left lower extremity swelling and redness with concerns for infection, DVT, etc.      Additional sources:    - Discussed/ obtained information from independent historians: Patient's wife is present and provides a fair amount of information.  She utilizes medical terms but will not tell me what her medical training has been previously for    - External (non-ED) record review: I reviewed documents showing the patient's 3 medications listed in HPI.  Unable to find antibiotic therapy on an outpatient basis other than seeing that the patient was on Augmentin in early March.    - Chronic or social conditions impacting care: Recent [facility] term        Orders placed during this visit:  Orders Placed This Encounter   Procedures    Blood Culture - Blood,    Blood Culture - Blood,    XR Foot 3+ View Left    XR Tibia Fibula 2 View Left    Comprehensive Metabolic Panel    Lactic Acid, Plasma    Procalcitonin    CK    Sedimentation Rate    C-reactive Protein    CBC Auto Differential    BNP    CBC & Differential         Additional orders considered but not ordered:  CT lower extremity    ED Course:    Consultants: Infectious disease, hospitalist    ED Course as of 04/08/25 2019 Tue Apr 08, 2025 1832 I saw the patient in the triage area as we have no beds for placing patients.  I have paged Dr. Ermias Delaney, infectious disease on-call to discuss the case. [MS]    1838 Spoke with Dr. Ermias Delaney, infectious disease on-call.  Case discussed in detail.  He recommends admission and I agree with this.  He recommends daptomycin and Rocephin.  Those are being ordered as we speak. [MS]   1845 I have ordered radiographs to evaluate for foreign body possibility. [MS]   1845 Attempting to get the patient moved out of the lobby so that we can institute IV antibiotics and prepare for admission. [MS]   2017 Sed Rate(!): 82 [MS]   2017 C-Reactive Protein(!): 2.12 [MS]   2017 WBC: 8.27 [MS]      ED Course User Index  [MS] Ricky Starr MD              Shared Decision Making:  After my consideration of clinical presentation and any laboratory/radiology studies obtained, I discussed the findings with the patient/patient representative who is in agreement with the treatment plan and the final disposition.   Risks and benefits of discharge and/or observation/admission were discussed.       AS OF 20:19 EDT VITALS:    BP - 134/82  HR - 66  TEMP - 98.5 °F (36.9 °C) (Oral)  O2 SATS - 96%                  DIAGNOSIS  Final diagnoses:   Cellulitis of left lower extremity   Failure of outpatient treatment         DISPOSITION  Admission      Please note that portions of this document were completed with voice recognition software.        Ricky Starr MD  04/09/25 1127

## 2025-04-09 ENCOUNTER — APPOINTMENT (OUTPATIENT)
Dept: CARDIOLOGY | Facility: HOSPITAL | Age: 75
DRG: 603 | End: 2025-04-09
Payer: MEDICARE

## 2025-04-09 LAB
ANION GAP SERPL CALCULATED.3IONS-SCNC: 11 MMOL/L (ref 5–15)
BASOPHILS # BLD AUTO: 0.09 10*3/MM3 (ref 0–0.2)
BASOPHILS NFR BLD AUTO: 1.2 % (ref 0–1.5)
BH CV LOWER VASCULAR LEFT COMMON FEMORAL AUGMENT: NORMAL
BH CV LOWER VASCULAR LEFT COMMON FEMORAL COMPRESS: NORMAL
BH CV LOWER VASCULAR LEFT COMMON FEMORAL PHASIC: NORMAL
BH CV LOWER VASCULAR LEFT COMMON FEMORAL SPONT: NORMAL
BH CV LOWER VASCULAR LEFT DISTAL FEMORAL AUGMENT: NORMAL
BH CV LOWER VASCULAR LEFT DISTAL FEMORAL COMPRESS: NORMAL
BH CV LOWER VASCULAR LEFT DISTAL FEMORAL PHASIC: NORMAL
BH CV LOWER VASCULAR LEFT DISTAL FEMORAL SPONT: NORMAL
BH CV LOWER VASCULAR LEFT GASTRONEMIUS COMPRESS: NORMAL
BH CV LOWER VASCULAR LEFT GREATER SAPH AK COMPRESS: NORMAL
BH CV LOWER VASCULAR LEFT GREATER SAPH BK COMPRESS: NORMAL
BH CV LOWER VASCULAR LEFT LESSER SAPH COMPRESS: NORMAL
BH CV LOWER VASCULAR LEFT MID FEMORAL AUGMENT: NORMAL
BH CV LOWER VASCULAR LEFT MID FEMORAL COMPRESS: NORMAL
BH CV LOWER VASCULAR LEFT MID FEMORAL PHASIC: NORMAL
BH CV LOWER VASCULAR LEFT MID FEMORAL SPONT: NORMAL
BH CV LOWER VASCULAR LEFT PERONEAL COMPRESS: NORMAL
BH CV LOWER VASCULAR LEFT POPLITEAL AUGMENT: NORMAL
BH CV LOWER VASCULAR LEFT POPLITEAL COMPRESS: NORMAL
BH CV LOWER VASCULAR LEFT POPLITEAL PHASIC: NORMAL
BH CV LOWER VASCULAR LEFT POPLITEAL SPONT: NORMAL
BH CV LOWER VASCULAR LEFT POSTERIOR TIBIAL COMPRESS: NORMAL
BH CV LOWER VASCULAR LEFT PROFUNDA FEMORAL PHASIC: NORMAL
BH CV LOWER VASCULAR LEFT PROFUNDA FEMORAL SPONT: NORMAL
BH CV LOWER VASCULAR LEFT PROXIMAL FEMORAL AUGMENT: NORMAL
BH CV LOWER VASCULAR LEFT PROXIMAL FEMORAL COMPRESS: NORMAL
BH CV LOWER VASCULAR LEFT PROXIMAL FEMORAL PHASIC: NORMAL
BH CV LOWER VASCULAR LEFT PROXIMAL FEMORAL SPONT: NORMAL
BH CV LOWER VASCULAR LEFT SAPHENOFEMORAL JUNCTION AUGMENT: NORMAL
BH CV LOWER VASCULAR LEFT SAPHENOFEMORAL JUNCTION COMPRESS: NORMAL
BH CV LOWER VASCULAR LEFT SAPHENOFEMORAL JUNCTION PHASIC: NORMAL
BH CV LOWER VASCULAR LEFT SAPHENOFEMORAL JUNCTION SPONT: NORMAL
BH CV LOWER VASCULAR RIGHT COMMON FEMORAL PHASIC: NORMAL
BH CV LOWER VASCULAR RIGHT COMMON FEMORAL SPONT: NORMAL
BUN SERPL-MCNC: 15 MG/DL (ref 8–23)
BUN/CREAT SERPL: 14.6 (ref 7–25)
CALCIUM SPEC-SCNC: 9.2 MG/DL (ref 8.6–10.5)
CHLORIDE SERPL-SCNC: 104 MMOL/L (ref 98–107)
CO2 SERPL-SCNC: 24 MMOL/L (ref 22–29)
CREAT SERPL-MCNC: 1.03 MG/DL (ref 0.76–1.27)
DEPRECATED RDW RBC AUTO: 42.2 FL (ref 37–54)
EGFRCR SERPLBLD CKD-EPI 2021: 75.8 ML/MIN/1.73
EOSINOPHIL # BLD AUTO: 0.24 10*3/MM3 (ref 0–0.4)
EOSINOPHIL NFR BLD AUTO: 3.2 % (ref 0.3–6.2)
ERYTHROCYTE [DISTWIDTH] IN BLOOD BY AUTOMATED COUNT: 12.7 % (ref 12.3–15.4)
GLUCOSE SERPL-MCNC: 100 MG/DL (ref 65–99)
HCT VFR BLD AUTO: 41.3 % (ref 37.5–51)
HGB BLD-MCNC: 14 G/DL (ref 13–17.7)
IMM GRANULOCYTES # BLD AUTO: 0.03 10*3/MM3 (ref 0–0.05)
IMM GRANULOCYTES NFR BLD AUTO: 0.4 % (ref 0–0.5)
LYMPHOCYTES # BLD AUTO: 2.37 10*3/MM3 (ref 0.7–3.1)
LYMPHOCYTES NFR BLD AUTO: 31.3 % (ref 19.6–45.3)
MCH RBC QN AUTO: 30.8 PG (ref 26.6–33)
MCHC RBC AUTO-ENTMCNC: 33.9 G/DL (ref 31.5–35.7)
MCV RBC AUTO: 90.8 FL (ref 79–97)
MONOCYTES # BLD AUTO: 0.64 10*3/MM3 (ref 0.1–0.9)
MONOCYTES NFR BLD AUTO: 8.4 % (ref 5–12)
NEUTROPHILS NFR BLD AUTO: 4.21 10*3/MM3 (ref 1.7–7)
NEUTROPHILS NFR BLD AUTO: 55.5 % (ref 42.7–76)
NRBC BLD AUTO-RTO: 0 /100 WBC (ref 0–0.2)
PLATELET # BLD AUTO: 356 10*3/MM3 (ref 140–450)
PMV BLD AUTO: 9.7 FL (ref 6–12)
POTASSIUM SERPL-SCNC: 4.1 MMOL/L (ref 3.5–5.2)
QT INTERVAL: 478 MS
QTC INTERVAL: 519 MS
RBC # BLD AUTO: 4.55 10*6/MM3 (ref 4.14–5.8)
SODIUM SERPL-SCNC: 139 MMOL/L (ref 136–145)
WBC NRBC COR # BLD AUTO: 7.58 10*3/MM3 (ref 3.4–10.8)

## 2025-04-09 PROCEDURE — 85025 COMPLETE CBC W/AUTO DIFF WBC: CPT | Performed by: NURSE PRACTITIONER

## 2025-04-09 PROCEDURE — 93971 EXTREMITY STUDY: CPT | Performed by: INTERNAL MEDICINE

## 2025-04-09 PROCEDURE — 25010000002 DAPTOMYCIN PER 1 MG: Performed by: INTERNAL MEDICINE

## 2025-04-09 PROCEDURE — 99232 SBSQ HOSP IP/OBS MODERATE 35: CPT | Performed by: FAMILY MEDICINE

## 2025-04-09 PROCEDURE — 25010000002 ENOXAPARIN PER 10 MG: Performed by: NURSE PRACTITIONER

## 2025-04-09 PROCEDURE — 25010000002 CEFTRIAXONE PER 250 MG: Performed by: NURSE PRACTITIONER

## 2025-04-09 PROCEDURE — 80048 BASIC METABOLIC PNL TOTAL CA: CPT | Performed by: NURSE PRACTITIONER

## 2025-04-09 PROCEDURE — 93971 EXTREMITY STUDY: CPT

## 2025-04-09 RX ORDER — FLUCONAZOLE 100 MG/1
200 TABLET ORAL ONCE
Status: COMPLETED | OUTPATIENT
Start: 2025-04-09 | End: 2025-04-09

## 2025-04-09 RX ADMIN — Medication 10 ML: at 20:53

## 2025-04-09 RX ADMIN — CEFTRIAXONE 2000 MG: 2 INJECTION, POWDER, FOR SOLUTION INTRAMUSCULAR; INTRAVENOUS at 13:56

## 2025-04-09 RX ADMIN — FLUCONAZOLE 200 MG: 100 TABLET ORAL at 13:56

## 2025-04-09 RX ADMIN — AMLODIPINE BESYLATE 5 MG: 5 TABLET ORAL at 10:10

## 2025-04-09 RX ADMIN — FUROSEMIDE 40 MG: 40 TABLET ORAL at 10:10

## 2025-04-09 RX ADMIN — LISINOPRIL 40 MG: 20 TABLET ORAL at 10:09

## 2025-04-09 RX ADMIN — Medication 10 ML: at 10:06

## 2025-04-09 RX ADMIN — DAPTOMYCIN 450 MG: 500 INJECTION, POWDER, LYOPHILIZED, FOR SOLUTION INTRAVENOUS at 16:52

## 2025-04-09 NOTE — PROGRESS NOTES
[facility] Medicine Services  PROGRESS NOTE    Patient Name: Carlos Simms  : 1950  MRN: [id number]    Date of Admission: 2025  Primary Care Physician: Stevan Keane MD    Subjective   Subjective     CC:  F/U LLE cellulitis     HPI:  Patient seen and examined. Feels his leg swelling and erythema are much improved. Concerned that there is a fungal component to his infection due to issues from wearing bowling shoes. Also has a wound between his toes. Denies recent trauma. No PVD. No DM.    Objective   Objective     Vital Signs:   Temp:  [97.4 °F (36.3 °C)-99 °F (37.2 °C)] 97.9 °F (36.6 °C)  Heart Rate:  [54-88] 67  Resp:  [18] 18  BP: (116-162)/(62-97) 153/92     Physical Exam:  Constitutional: No acute distress, awake, alert  HENT: NCAT, mucous membranes moist  Respiratory: Clear to auscultation bilaterally, respiratory effort normal   Cardiovascular: RRR, no murmurs, rubs, or gallops  Gastrointestinal: Positive bowel sounds, soft, nontender, nondistended  Musculoskeletal: LLE swelling, erythema noted  Psychiatric: Appropriate affect, cooperative  Neurologic: Oriented x 3, strength symmetric in all extremities, Cranial Nerves grossly intact to confrontation, speech clear  Skin: per above, small wound noted between L 4th/5th toes     Results Reviewed:  LAB RESULTS:      Lab 25  1031 25  2212 25  1901   WBC 7.58  --  8.27   HEMOGLOBIN 14.0  --  14.1   HEMATOCRIT 41.3  --  41.1   PLATELETS 356  --  348   NEUTROS ABS 4.21  --  4.92   IMMATURE GRANS (ABS) 0.03  --  0.04   LYMPHS ABS 2.37  --  2.31   MONOS ABS 0.64  --  0.69   EOS ABS 0.24  --  0.23   MCV 90.8  --  90.5   SED RATE  --   --  82*   CRP  --   --  2.12*   PROCALCITONIN  --   --  0.06   LACTATE  --   --  1.4   D DIMER QUANT  --  2.00*  --          Lab 25  1031 25  1901   SODIUM 139 137   POTASSIUM 4.1 4.8   CHLORIDE 104 101   CO2 24.0 26.0   ANION GAP 11.0 10.0   BUN 15 20   CREATININE  1.03 1.15   EGFR 75.8 66.4   GLUCOSE 100* 103*   CALCIUM 9.2 9.5         Lab 04/08/25  1901   TOTAL PROTEIN 8.0   ALBUMIN 3.8   GLOBULIN 4.2   ALT (SGPT) 17   AST (SGOT) 22   BILIRUBIN 0.4   ALK PHOS 106         Lab 04/08/25  1901   PROBNP 246.4                 Brief Urine Lab Results  (Last result in the past 365 days)        Color   Clarity   Blood   Leuk Est   Nitrite   Protein   CREAT   Urine HCG        04/08/25 2316 Yellow   Clear   Negative   Negative   Negative   Negative                   Microbiology Results Abnormal       None            XR Foot 3+ View Left  Addendum Date: 4/8/2025  ADDENDUM #1 Please disregard the typographical error in the report. Report should read as follows: FINDINGS: There is no radiographic evidence of acute fracture or dislocation. Joint spaces are preserved. There is marked diffuse left foot soft tissue swelling. No radiopaque foreign bodies visualized. IMPRESSION: Marked diffuse left foot soft tissue swelling. Correlate for cellulitis. No radiopaque foreign bodies visualized. No acute fracture. Discussed with Dr. Starr. Electronically Signed: Adrian Blackwood MD  4/8/2025 10:34 PM EDT  Workstation ID: [id number] ORIGINAL REPORT: XR FOOT 3+ VW LEFT Date of Exam: 4/8/2025 7:34 PM EDT Indication: Consider foreign body with cellulitis Comparison: None available. Findings: There is no radiographic evidence of acute fracture or dislocation. Joint spaces are preserved. There is marked diffuse left shoulder soft tissue swelling. No radiopaque foreign bodies visualized. Impression: Marked diffuse left shoulder soft tissue swelling. Correlate for cellulitis. No radiopaque foreign bodies visualized. No acute fracture. Electronically Signed: Adrian Blackwood MD  4/8/2025 8:09 PM EDT  Workstation ID: [id number]    Result Date: 4/8/2025  XR FOOT 3+ VW LEFT Date of Exam: 4/8/2025 7:34 PM EDT Indication: Consider foreign body with cellulitis Comparison: None available. Findings: There is no  radiographic evidence of acute fracture or dislocation. Joint spaces are preserved. There is marked diffuse left shoulder soft tissue swelling. No radiopaque foreign bodies visualized.     Impression: Impression: Marked diffuse left shoulder soft tissue swelling. Correlate for cellulitis. No radiopaque foreign bodies visualized. No acute fracture. Electronically Signed: Adrian Blackwood MD  4/8/2025 8:09 PM EDT  Workstation ID: [id number]    XR Tibia Fibula 2 View Left  Result Date: 4/8/2025  XR TIBIA FIBULA 2 VW LEFT Date of Exam: 4/8/2025 7:34 PM EDT Indication: Consider foreign body with lower extremity cellulitis Comparison: None available. Findings: There is no radiographic evidence of acute fracture or dislocation. Joint spaces are preserved. Diffuse soft tissue swelling around the left tibia and fibula is visualized. No radiopaque foreign bodies visualized.     Impression: Impression: Diffuse soft tissue swelling around the left tibia and fibula. Correlate for cellulitis. No radiopaque foreign bodies visualized. No acute fracture. Electronically Signed: Adrian Blackwood MD  4/8/2025 8:08 PM EDT  Workstation ID: [id number]          Current medications:  Scheduled Meds:amLODIPine, 5 mg, Oral, Daily  cefTRIAXone, 2,000 mg, Intravenous, Q24H  DAPTOmycin, 6 mg/kg (Adjusted), Intravenous, Q24H  enoxaparin sodium, 40 mg, Subcutaneous, Daily  furosemide, 40 mg, Oral, Daily  lisinopril, 40 mg, Oral, Daily  sodium chloride, 10 mL, Intravenous, Q12H      Continuous Infusions:   PRN Meds:.  acetaminophen    senna-docusate sodium **AND** polyethylene glycol **AND** bisacodyl **AND** bisacodyl    melatonin    sodium chloride    sodium chloride    Assessment & Plan   Assessment & Plan     Active Hospital Problems    Diagnosis  POA    **Cellulitis [L03.90]  Yes    HTN (hypertension) [I10]  Yes      Resolved Hospital Problems   No resolved problems to display.        Brief Hospital Course to date:  Carlos Simms is a 74 y.o. male  w/ a hx of HTN, benign essential tremor and osteoarthritis who presented to the ED w/ c/o left left swelling.     This patient's problems and plans were partially entered by my partner and updated as appropriate by me 04/09/25.   All problems are new to me today     LLE Cellulitis   -developed erythema, edema around January 1st 2025   -s/p 2 rounds of antibiotics since January   -Prelim duplex report negative for DVT  -Continue Rocephin + Dapto  -ID to see in consult      HTN   -continue routine Norvasc, Lasix and Lisinopril w/ hold parameters     Expected Discharge Location and Transportation: Home  Expected Discharge   Expected Discharge Date: 4/10/2025; Expected Discharge Time:      VTE Prophylaxis:  Pharmacologic VTE prophylaxis orders are present.         AM-PAC 6 Clicks Score (PT): 22 (04/09/25 1000)    CODE STATUS:   Code Status and Medical Interventions: CPR (Attempt to Resuscitate); Full Support   Ordered at: 04/08/25 2128     Code Status (Patient has no pulse and is not breathing):    CPR (Attempt to Resuscitate)     Medical Interventions (Patient has pulse or is breathing):    Full Support       Imani Riojas DO  04/09/25

## 2025-04-09 NOTE — H&P
"    [facility] Medicine Services  HISTORY AND PHYSICAL    Patient Name: Carlos Simms  : 1950  MRN: [id number]  Primary Care Physician: Stevan Keane MD  Date of admission: 2025    Subjective   Subjective     Chief Complaint:  Left leg swelling     HPI:  Carlos Simms is a 74 y.o. male w/ a hx of HTN, benign essential tremor and osteoarthritis who presented to the ED w/ c/o left left swelling.   Pt developed erythema and edema of his LLE around the beginning of 2025. Pt has since been on two rounds of antibiotics. Pt most recently completed the second antibiotic course this morning. Pt describes that the edema briefly improved while on the antibiotic but then returned and has progressively gotten worse. Pt w/ a hx of tinea pedis and LLE cellulitis ~ 1 year ago that was treated w/ an antibiotic and fungal cream. Pt reports that the tinea pedis never completely resolved. Pt is unable to recall the name of the antibiotics. Pt started on Lasix in January for the edema but reports that overall he has not seen any improvement. Pt denies hx of LLE wounds or injury. Pt denies fever, dyspnea, chest pain. Pt seen by his PCP today and sent to the ED for further evaluation.   Pt evaluated in the ED. Tib/Fib xray revealed \"Diffuse soft tissue swelling around the left tibia and fibula\". Sed Rate and CRP both slightly elevated. Procal, WBC, lactic acid all WNL. Pt admitted to the hospital medicine service for further evaluation.     Review of Systems   Constitutional: Negative.  Negative for chills and fever.   HENT: Negative.  Negative for congestion, postnasal drip, rhinorrhea, sinus pain and trouble swallowing.    Eyes: Negative.  Negative for visual disturbance.   Respiratory: Negative.  Negative for cough and shortness of breath.    Cardiovascular:  Positive for leg swelling. Negative for chest pain and palpitations.   Gastrointestinal: Negative.  Negative for abdominal " distention, abdominal pain, constipation, diarrhea, nausea and vomiting.   Endocrine: Negative.    Genitourinary: Negative.  Negative for decreased urine volume, difficulty urinating, dysuria, frequency and urgency.   Musculoskeletal: Negative.  Negative for arthralgias and myalgias.   Skin:  Positive for color change. Negative for wound.        LLE w/ erythema and edema.    Allergic/Immunologic: Negative.  Negative for immunocompromised state.   Neurological: Negative.  Negative for dizziness, syncope, weakness, light-headedness and headaches.   Hematological: Negative.  Does not bruise/bleed easily.   Psychiatric/Behavioral: Negative.  Negative for confusion.    All other systems reviewed and are negative.     Personal History     Past Medical History:   Diagnosis Date    Benign essential tremor     Carpal tunnel syndrome on both sides     Essential hypertension     Usually controlled with lifestyle efforts    Osteoarthritis      Past Surgical History:   Procedure Laterality Date    TRIGGER FINGER RELEASE Right     Right thumb    VASECTOMY      VASECTOMY REVERSAL       Family History: family history includes Diabetes type II in his brother; Hypertension in his brother; Leukemia (age of onset: 57) in his sister; Liver cancer (age of onset: 65) in his father; No Known Problems in his mother.     Social History:  reports that he has quit smoking. His smoking use included cigarettes. He has never used smokeless tobacco. He reports that he does not drink alcohol and does not use drugs.  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  He was  once and then got remarried 23 years ago.  He has 5 children from his first wife and 7 from his second.  He is originally [place], [place] here few years ago.  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  He attends an independent [employer]     Medications:  amLODIPine, furosemide, and lisinopril    Allergies   Allergen Reactions    Latex Rash     Objective   Objective     Vital Signs:   Temp:  [98.5 °F (36.9 °C)-99 °F (37.2 °C)] 98.5 °F (36.9 °C)  Heart Rate:  [55-88] 65  Resp:  [18] 18  BP: (132-162)/(71-97) 136/76    Physical Exam     Constitutional: Awake, alert; non-toxic appearing   Eyes: PERRLA, sclerae anicteric, no conjunctival injection  HENT: NCAT, mucous membranes moist  Neck: Supple, no thyromegaly, no lymphadenopathy, trachea midline  Respiratory: Clear to auscultation bilaterally, nonlabored respirations   Cardiovascular: RRR, no murmurs, rubs, or gallops, palpable pedal pulses bilaterally  Gastrointestinal: Positive bowel sounds, soft, nontender, nondistended  Musculoskeletal: Normal ROM bilaterally   Psychiatric: Appropriate affect, cooperative  Neurologic: Oriented x 3, strength symmetric in all extremities, speech clear  Skin: No rashes, no lesions or open wounds; see photo below of LLE- erythema and edema extends from left foot to below knee, warm to touch         Result Review:  I have personally reviewed the results from the time of this admission to 4/8/2025 21:35 EDT and agree with these findings:  [x]  Laboratory list / accordion  []  Microbiology  [x]  Radiology  []  EKG/Telemetry   []  Cardiology/Vascular   []  Pathology  [x]  Old records    LAB RESULTS:      Lab 04/08/25 1901   WBC 8.27   HEMOGLOBIN 14.1   HEMATOCRIT 41.1   PLATELETS 348   NEUTROS ABS 4.92   IMMATURE GRANS (ABS) 0.04   LYMPHS ABS 2.31   MONOS ABS 0.69   EOS ABS 0.23   MCV 90.5   SED RATE 82*   CRP 2.12*   PROCALCITONIN 0.06   LACTATE 1.4   CK TOTAL 98         Lab 04/08/25 1901   SODIUM 137   POTASSIUM 4.8   CHLORIDE 101   CO2 26.0   ANION GAP 10.0   BUN 20   CREATININE 1.15   EGFR 66.4   GLUCOSE  103*   CALCIUM 9.5         Lab 04/08/25  1901   TOTAL PROTEIN 8.0   ALBUMIN 3.8   GLOBULIN 4.2   ALT (SGPT) 17   AST (SGOT) 22   BILIRUBIN 0.4   ALK PHOS 106         Lab 04/08/25  1901   PROBNP 246.4                 Brief Urine Lab Results       None          Microbiology Results (last 10 days)       ** No results found for the last 240 hours. **          XR Foot 3+ View Left  Result Date: 4/8/2025  XR FOOT 3+ VW LEFT Date of Exam: 4/8/2025 7:34 PM EDT Indication: Consider foreign body with cellulitis Comparison: None available. Findings: There is no radiographic evidence of acute fracture or dislocation. Joint spaces are preserved. There is marked diffuse left shoulder soft tissue swelling. No radiopaque foreign bodies visualized.     Impression: Impression: Marked diffuse left shoulder soft tissue swelling. Correlate for cellulitis. No radiopaque foreign bodies visualized. No acute fracture. Electronically Signed: Adrian Blackwood MD  4/8/2025 8:09 PM EDT  Workstation ID: [id number]    XR Tibia Fibula 2 View Left  Result Date: 4/8/2025  XR TIBIA FIBULA 2 VW LEFT Date of Exam: 4/8/2025 7:34 PM EDT Indication: Consider foreign body with lower extremity cellulitis Comparison: None available. Findings: There is no radiographic evidence of acute fracture or dislocation. Joint spaces are preserved. Diffuse soft tissue swelling around the left tibia and fibula is visualized. No radiopaque foreign bodies visualized.     Impression: Impression: Diffuse soft tissue swelling around the left tibia and fibula. Correlate for cellulitis. No radiopaque foreign bodies visualized. No acute fracture. Electronically Signed: Adrian Blackwood MD  4/8/2025 8:08 PM EDT  Workstation ID: [id number]        Assessment & Plan   Assessment & Plan       Cellulitis    HTN (hypertension)    Carlos Simms is a 74 y.o. male w/ a hx of HTN, benign essential tremor and osteoarthritis who presented to the ED w/ c/o left left swelling.     **LLE Cellulitis    -developed erythema, edema around January 1st 2025   -s/p 2 rounds of antibiotics since January (completed 2nd round today, pt unable to recall name of antibiotics)  -xray c/w soft tissue swelling  -consider CT LLE   -D.Dimer pending; LLE venous duplex pending   -blood cx pending   -WBC, lactic acid and procal all WNL   -sed rate 82, crp 2.12  -ED provider spoke w/ Dr. Ermias Boss w/ ID- recommended Daptomycin and Rocephin  -symptom mgt  -repeat labs in am   -ID consult in am     **HTN   -continue routine Norvasc, Lasix and Lisinopril w/ hold parameters     DVT prophylaxis:  Lovenox     CODE STATUS:    Code Status (Patient has no pulse and is not breathing): CPR (Attempt to Resuscitate)  Medical Interventions (Patient has pulse or is breathing): Full Support    Expected Discharge  Expected Discharge Date: 4/10/2025; Expected Discharge Time:     Signature: Electronically signed by KATI Medrano, 04/08/25, 9:34 PM EDT.    Time spent: 55 minutes

## 2025-04-09 NOTE — ED NOTES
" Carlos Lakhani    Nursing Report ED to Floor:  Mental status: AOx4  Ambulatory status: Self  Oxygen Therapy:  RA  Cardiac Rhythm: NSR  Admitted from: Home  Safety Concerns:  Fall risk  Precautions: None  Social Issues: None  ED Room #:  20    ED Nurse Phone Extension - [phone] or may call [phone].      HPI:   Chief Complaint   Patient presents with    Leg Swelling       Past Medical History:  Past Medical History:   Diagnosis Date    Benign essential tremor     Carpal tunnel syndrome on both sides     Essential hypertension     Usually controlled with lifestyle efforts    Osteoarthritis         Past Surgical History:  Past Surgical History:   Procedure Laterality Date    TRIGGER FINGER RELEASE Right     Right thumb    VASECTOMY      VASECTOMY REVERSAL          Admitting Doctor:   Sussy Villalobos MD    Consulting Provider(s):  Consults       No orders found from 3/10/2025 to 4/9/2025.             Admitting Diagnosis:   The primary encounter diagnosis was Cellulitis of left lower extremity. A diagnosis of Failure of outpatient treatment was also pertinent to this visit.    Most Recent Vitals:   Vitals:    04/08/25 1639 04/08/25 1930 04/08/25 2000 04/08/25 2030   BP: 139/97 149/79 134/82 132/71   BP Location: Left arm      Patient Position: Sitting      Pulse: 69 55 66 63   Resp: 18      Temp: 98.5 °F (36.9 °C)      TempSrc: Oral      SpO2: 96% 97% 96% 95%   Weight: 87.1 kg (192 lb)      Height: 170.2 cm (67\")          Active LDAs/IV Access:   Lines, Drains & Airways       Active LDAs       Name Placement date Placement time Site Days    Peripheral IV 04/08/25 1901 Right Antecubital 04/08/25  1901  Antecubital  less than 1                    Labs (abnormal labs have a star):   Labs Reviewed   COMPREHENSIVE METABOLIC PANEL - Abnormal; Notable for the following components:       Result Value    Glucose 103 (*)     All other components within normal limits    Narrative:     GFR Categories in Chronic Kidney Disease " "(CKD)      GFR Category          GFR (mL/min/1.73)    Interpretation  G1                     90 or greater         Normal or high (1)  G2                      60-89                Mild decrease (1)  G3a                   45-59                Mild to moderate decrease  G3b                   30-44                Moderate to severe decrease  G4                    15-29                Severe decrease  G5                    14 or less           Kidney failure          (1)In the absence of evidence of kidney disease, neither GFR category G1 or G2 fulfill the criteria for CKD.    eGFR calculation 2021 CKD-EPI creatinine equation, which does not include race as a factor   SEDIMENTATION RATE - Abnormal; Notable for the following components:    Sed Rate 82 (*)     All other components within normal limits   C-REACTIVE PROTEIN - Abnormal; Notable for the following components:    C-Reactive Protein 2.12 (*)     All other components within normal limits   LACTIC ACID, PLASMA - Normal   PROCALCITONIN - Normal    Narrative:     As a Marker for Sepsis (Non-Neonates):    1. <0.5 ng/mL represents a low risk of severe sepsis and/or septic shock.  2. >2 ng/mL represents a high risk of severe sepsis and/or septic shock.    As a Marker for Lower Respiratory Tract Infections that require antibiotic therapy:    PCT on Admission    Antibiotic Therapy       6-12 Hrs later    >0.5                Strongly Recommended  >0.25 - <0.5        Recommended   0.1 - 0.25          Discouraged              Remeasure/reassess PCT  <0.1                Strongly Discouraged     Remeasure/reassess PCT    As 28 day mortality risk marker: \"Change in Procalcitonin Result\" (>80% or <=80%) if Day 0 (or Day 1) and Day 4 values are available. Refer to http://www.[employer]-pct-calculator.com    Change in PCT <=80%  A decrease of PCT levels below or equal to 80% defines a positive change in PCT test result representing a higher risk for 28-day all-cause mortality of " patients diagnosed with severe sepsis for septic shock.    Change in PCT >80%  A decrease of PCT levels of more than 80% defines a negative change in PCT result representing a lower risk for 28-day all-cause mortality of patients diagnosed with severe sepsis or septic shock.      CK - Normal   CBC WITH AUTO DIFFERENTIAL - Normal   BNP (IN-HOUSE) - Normal    Narrative:     This assay is used as an aid in the diagnosis of individuals suspected of having heart failure. It can be used as an aid in the diagnosis of acute decompensated heart failure (ADHF) in patients presenting with signs and symptoms of ADHF to the emergency department (ED). In addition, NT-proBNP of <300 pg/mL indicates ADHF is not likely.    Age Range Result Interpretation  NT-proBNP Concentration (pg/mL:      <50             Positive            >450                   Gray                 300-450                    Negative             <300    50-75           Positive            >900                  Gray                300-900                  Negative            <300      >75             Positive            >1800                  Gray                300-1800                  Negative            <300   BLOOD CULTURE   BLOOD CULTURE   CBC AND DIFFERENTIAL    Narrative:     The following orders were created for panel order CBC & Differential.  Procedure                               Abnormality         Status                     ---------                               -----------         ------                     CBC Auto Differential[[id number]]        Normal              Final result                 Please view results for these tests on the individual orders.       Meds Given in ED:   Medications   DAPTOmycin (CUBICIN) 450 mg in sodium chloride 0.9 % 50 mL IVPB (has no administration in time range)   cefTRIAXone (ROCEPHIN) 2,000 mg in sodium chloride 0.9 % 100 mL MBP (0 mg Intravenous Stopped 4/8/25 2015)           Last NIH score:                                                           Dysphagia screening results:  Patient Factors Component (Dysphagia:Stroke or Rule-out)  Best Eye Response: 4-->(E4) spontaneous (04/08/25 1937)  Best Motor Response: 6-->(M6) obeys commands (04/08/25 1937)  Best Verbal Response: 5-->(V5) oriented (04/08/25 1937)  Amy Coma Scale Score: 15 (04/08/25 1937)     [place] Coma Scale:  No data recorded     CIWA:        Restraint Type:            Isolation Status:  No active isolations

## 2025-04-09 NOTE — PLAN OF CARE
Goal Outcome Evaluation:   Pt received from EMR. He is A+O X 4. VSS with NSR with PVCs. He is currently resting in bed with call light on reach.

## 2025-04-09 NOTE — CONSULTS
"    INFECTIOUS DISEASE CONSULT/INITIAL HOSPITAL VISIT    Carlos Simms  1950  [id number]    Date of Consult: 2025    Admission Date: 2025      Requesting Provider: No ref. provider found  Evaluating Physician: Ricky Lubin MD    Reason for Consultation:  left leg cellulitis     History of present illness:    Patient is a 74 y.o. male with history of hypertension, benign essential tremor, tinea pedis, and prior left leg cellulitis who is seen today for evaluation of recurrent left leg cellulitis and left foot tinea pedis unresponsive to oral antibiotic therapy.   He presented to his primary care provider, Dr. Keane, yesterday and was instructed to go to the emergency room.  He was noted to have significant left leg erythema and swelling.  Laboratory studies revealed a white blood cell count of 8.3, sedimentation rate of 82, and C-reactive protein of 2.1.  Procalcitonin was 0.06 and lactic acid was 1.4.  Blood cultures were obtained and he was started on intravenous daptomycin and ceftriaxone.  He complains of recurrent tinea pedis and moderate left leg swelling.     Past Medical History:   Diagnosis Date    Benign essential tremor     Carpal tunnel syndrome on both sides     Essential hypertension     Usually controlled with lifestyle efforts    Osteoarthritis        Past Surgical History:   Procedure Laterality Date    TRIGGER FINGER RELEASE Right     Right thumb    VASECTOMY      VASECTOMY REVERSAL         Family History   Problem Relation Age of Onset    No Known Problems Mother          in her 90s of \"old age\"    Liver cancer Father 65    Leukemia Sister 57    Diabetes type II Brother     Hypertension Brother         Pulmonary hypertension       Social History     Socioeconomic History    Marital status:    Tobacco Use    Smoking status: Former     Types: Cigarettes    Smokeless tobacco: Never   Vaping Use    Vaping status: Never Used   Substance and Sexual Activity    Alcohol " use: Never    Drug use: Never    Sexual activity: Defer       Allergies   Allergen Reactions    Latex Rash         Medication:    Current Facility-Administered Medications:     acetaminophen (TYLENOL) tablet 650 mg, 650 mg, Oral, Q4H PRN, Cony Peoples APRN    amLODIPine (NORVASC) tablet 5 mg, 5 mg, Oral, Daily, Cony Peoples APRN    sennosides-docusate (PERICOLACE) 8.6-50 MG per tablet 2 tablet, 2 tablet, Oral, BID PRN **AND** polyethylene glycol (MIRALAX) packet 17 g, 17 g, Oral, Daily PRN **AND** bisacodyl (DULCOLAX) EC tablet 5 mg, 5 mg, Oral, Daily PRN **AND** bisacodyl (DULCOLAX) suppository 10 mg, 10 mg, Rectal, Daily PRN, Cony Peoples APRN    cefTRIAXone (ROCEPHIN) 2,000 mg in sodium chloride 0.9 % 100 mL MBP, 2,000 mg, Intravenous, Q24H, Cony Peoples APRN    DAPTOmycin (CUBICIN) 450 mg in sodium chloride 0.9 % 50 mL IVPB, 6 mg/kg (Adjusted), Intravenous, Q24H, Cony Peoples APRN    enoxaparin sodium (LOVENOX) syringe 40 mg, 40 mg, Subcutaneous, Daily, Cony Peoples APRN    furosemide (LASIX) tablet 40 mg, 40 mg, Oral, Daily, Cony Peoples APRN    lisinopril (PRINIVIL,ZESTRIL) tablet 40 mg, 40 mg, Oral, Daily, Cony Peoples APRN    melatonin tablet 5 mg, 5 mg, Oral, Nightly PRN, Cony Peoples APRN    sodium chloride 0.9 % flush 10 mL, 10 mL, Intravenous, Q12H, Cony Peoples APRN, 10 mL at 04/08/25 2250    sodium chloride 0.9 % flush 10 mL, 10 mL, Intravenous, PRN, Cony Peoples APRN    sodium chloride 0.9 % infusion 40 mL, 40 mL, Intravenous, PRN, Cony Pepoles APRN    Antibiotics:  Anti-Infectives (From admission, onward)      Ordered     Dose/Rate Route Frequency Start Stop    04/08/25 2145  DAPTOmycin (CUBICIN) 450 mg in sodium chloride 0.9 % 50 mL IVPB        Ordering Provider: Cony Peoples APRN    6 mg/kg × 74.5 kg (Adjusted)  100 mL/hr over 30 Minutes Intravenous Every 24 Hours 04/09/25 2100 04/12/25 2059 04/08/25 8818  cefTRIAXone (ROCEPHIN) 2,000 mg in sodium chloride  0.9 % 100 mL MBP        Ordering Provider: Cony Peoples APRN    2,000 mg  200 mL/hr over 30 Minutes Intravenous Every 24 Hours 04/09/25 2000 04/14/25 1959 04/08/25 2150  DAPTOmycin (CUBICIN) 450 mg in sodium chloride 0.9 % 50 mL IVPB        Ordering Provider: Nicholas Alva RPH    6 mg/kg × 74.5 kg (Adjusted)  100 mL/hr over 30 Minutes Intravenous Once 04/08/25 2230 04/08/25 2244 04/08/25 1840  DAPTOmycin (CUBICIN) 450 mg in sodium chloride 0.9 % 50 mL IVPB  Status:  Discontinued        Ordering Provider: Ricky Starr MD    6 mg/kg × 74.5 kg (Adjusted)  100 mL/hr over 30 Minutes Intravenous Once 04/08/25 2015 04/08/25 2150 04/08/25 1828  cefTRIAXone (ROCEPHIN) 2,000 mg in sodium chloride 0.9 % 100 mL MBP        Ordering Provider: Ricky Starr MD    2,000 mg  200 mL/hr over 30 Minutes Intravenous Once 04/08/25 2000 04/08/25 2015 04/08/25 1828  vancomycin IVPB 1750 mg in 0.9% Sodium Chloride (premix) 500 mL  Status:  Discontinued        Ordering Provider: Ricky Starr MD    20 mg/kg × 87.1 kg  285.7 mL/hr over 105 Minutes Intravenous Once 04/08/25 1844 04/08/25 1840 04/08/25 1828  metroNIDAZOLE (FLAGYL) tablet 500 mg  Status:  Discontinued        Ordering Provider: Ricky Starr MD    500 mg Oral Once 04/08/25 1844 04/08/25 1840              Review of Systems:  Constitutional-- No Fever, chills or sweats.  Appetite good, and no malaise. No fatigue.  HEENT-- No new vision, hearing or throat complaints.  No epistaxis or oral sores.  Denies odynophagia or dysphagia. No headache, photophobia or neck stiffness.  CV--   Hypertension  Resp-- No SOB/cough/Hemoptysis  GI- No nausea, vomiting, or diarrhea.  No hematochezia, melena, or hematemesis. Denies jaundice or chronic liver disease.  -- No dysuria, hematuria, or flank pain.   Heme- No active bruising or bleeding  MS--left leg swelling and erythema.  Neuro-- No acute focal weakness or numbness in the arms or legs.  No seizures.  Skin--  left leg swelling and erythema.  History of tinea pedis.      Physical Exam:   Vital Signs  Temp (24hrs), Av.1 °F (36.7 °C), Min:97.4 °F (36.3 °C), Max:99 °F (37.2 °C)    Temp  Min: 97.4 °F (36.3 °C)  Max: 99 °F (37.2 °C)  BP  Min: 116/69  Max: 162/82  Pulse  Min: 54  Max: 88  Resp  Min: 18  Max: 18  SpO2  Min: 93 %  Max: 97 %    GENERAL: Awake and alert, in no acute distress.   HEENT: Normocephalic, atraumatic.  PERRL. EOMI. No conjunctival injection. No icterus. Oropharynx clear without evidence of thrush or exudate.   NECK: Supple   HEART: RRR; No murmur, rubs, gallops.   LUNGS: Clear to auscultation bilaterally without wheezing, rales, rhonchi. Normal respiratory effort. Nonlabored.   ABDOMEN: Soft, nontender, nondistended.  No rebound or guarding. NO mass or HSM.  EXT:    Moderate left leg swelling and erythema.  He has moderately extensive left foot tinea pedis which is most severe in the fourth webspace.  :  Without Oconnor catheter.  MSK: No joint effusions or erythema  SKIN: Warm and dry without cutaneous eruptions on Inspection/palpation.    NEURO: Oriented to PPT.  Motor 5/5 strength  PSYCHIATRIC: Normal insight and judgment. Cooperative with PE    Laboratory Data    Results from last 7 days   Lab Units 25  190   WBC 10*3/mm3 8.27   HEMOGLOBIN g/dL 14.1   HEMATOCRIT % 41.1   PLATELETS 10*3/mm3 348     Results from last 7 days   Lab Units 25  190   SODIUM mmol/L 137   POTASSIUM mmol/L 4.8   CHLORIDE mmol/L 101   CO2 mmol/L 26.0   BUN mg/dL 20   CREATININE mg/dL 1.15   GLUCOSE mg/dL 103*   CALCIUM mg/dL 9.5     Results from last 7 days   Lab Units 25  1901   ALK PHOS U/L 106   BILIRUBIN mg/dL 0.4   ALT (SGPT) U/L 17   AST (SGOT) U/L 22     Results from last 7 days   Lab Units 25  1901   SED RATE mm/hr 82*     Results from last 7 days   Lab Units 25  1901   CRP mg/dL 2.12*     Results from last 7 days   Lab Units 25  1901   LACTATE mmol/L 1.4     Results from last 7  "days   Lab Units 04/08/25  1901   CK TOTAL U/L 98         Estimated Creatinine Clearance: 58.5 mL/min (by C-G formula based on SCr of 1.15 mg/dL).      Microbiology:  No results found for: \"ACANTHNAEG\", \"AFBCX\", \"BPERTUSSISCX\", \"BLOODCX\"  No results found for: \"BCIDPCR\", \"CXREFLEX\", \"CSFCX\", \"CULTURETIS\"  No results found for: \"CULTURES\", \"HSVCX\", \"URCX\"  No results found for: \"EYECULTURE\", \"GCCX\", \"HSVCULTURE\", \"LABHSV\"  No results found for: \"LEGIONELLA\", \"MRSACX\", \"MUMPSCX\", \"MYCOPLASCX\"  No results found for: \"NOCARDIACX\", \"STOOLCX\"  No results found for: \"THROATCX\", \"UNSTIMCULT\", \"URINECX\", \"CULTURE\", \"VZVCULTUR\"  No results found for: \"VIRALCULTU\", \"WOUNDCX\"        Radiology:  Imaging Results (Last 72 Hours)       Procedure Component Value Units Date/Time    XR Foot 3+ View Left [[id number]] Collected: 04/08/25 2008     Updated: 04/08/25 2234    Addenda:        ADDENDUM #1  Please disregard the typographical error in the report. Report should read   as follows:    FINDINGS: There is no radiographic evidence of acute fracture or   dislocation. Joint spaces are preserved. There is marked diffuse left foot   soft tissue swelling. No radiopaque foreign bodies visualized.    IMPRESSION: Marked diffuse left foot soft tissue swelling. Correlate for   cellulitis. No radiopaque foreign bodies visualized. No acute fracture.    Discussed with Dr. Starr.    Electronically Signed: Adrian Blackwood MD    4/8/2025 10:34 PM EDT    Workstation ID: [id number]  ORIGINAL REPORT:  XR FOOT 3+ VW LEFT    Date of Exam: 4/8/2025 7:34 PM EDT    Indication: Consider foreign body with cellulitis    Comparison: None available.    Findings:  There is no radiographic evidence of acute fracture or dislocation. Joint   spaces are preserved. There is marked diffuse left shoulder soft tissue   swelling. No radiopaque foreign bodies visualized.    Impression:  Marked diffuse left shoulder soft tissue swelling. Correlate for   cellulitis. No " radiopaque foreign bodies visualized. No acute fracture.        Electronically Signed: Adrian Blackwood MD    4/8/2025 8:09 PM EDT    Workstation ID: [id number]  Signed: 04/08/25 2234 by Adrian Blackwood MD    Narrative:      XR FOOT 3+ VW LEFT    Date of Exam: 4/8/2025 7:34 PM EDT    Indication: Consider foreign body with cellulitis    Comparison: None available.    Findings:  There is no radiographic evidence of acute fracture or dislocation. Joint spaces are preserved. There is marked diffuse left shoulder soft tissue swelling. No radiopaque foreign bodies visualized.      Impression:      Impression:  Marked diffuse left shoulder soft tissue swelling. Correlate for cellulitis. No radiopaque foreign bodies visualized. No acute fracture.        Electronically Signed: Adrian Blackwood MD    4/8/2025 8:09 PM EDT    Workstation ID: [id number]    XR Tibia Fibula 2 View Left [[id number]] Collected: 04/08/25 2007     Updated: 04/08/25 2011    Narrative:      XR TIBIA FIBULA 2 VW LEFT    Date of Exam: 4/8/2025 7:34 PM EDT    Indication: Consider foreign body with lower extremity cellulitis    Comparison: None available.    Findings:  There is no radiographic evidence of acute fracture or dislocation. Joint spaces are preserved. Diffuse soft tissue swelling around the left tibia and fibula is visualized. No radiopaque foreign bodies visualized.      Impression:      Impression:  Diffuse soft tissue swelling around the left tibia and fibula. Correlate for cellulitis. No radiopaque foreign bodies visualized. No acute fracture.        Electronically Signed: Adrian Blackwood MD    4/8/2025 8:08 PM EDT    Workstation ID: [id number]              Impression:   1.  Left leg cellulitis-refractory to oral antibiotic therapy.  This is most likely staphylococcal or streptococcal in origin.  The portal of entry is likely his fairly extensive left foot tinea pedis and he will be at risk for recurrent cellulitis if we do not resolve the tinea  pedis.  2.  Left foot tinea pedis-this has been difficult to control.  His tinea pedis is the likely portal of entry for his cellulitis.  I will start him on fluconazole at 200 mg p.o. weekly x 6 weeks along with topical butenafine    PLAN/RECOMMENDATIONS:   Thank you for asking us to see Carlos Simms, I recommend the followin. Butenafine (Lotrimin ultra) twice daily to tinea pedis   2.  Fluconazole 200 mg p.o. weekly x 6 weeks  3.  Intravenous daptomycin  4.  Intravenous ceftriaxone  5.  Blood cultures-pending      I discussed his complex situation in detail with him today.  He may be ready for discharge soon with ongoing outpatient intravenous antibiotic therapy daily in our office.    This visit included the following complex service elements:  Complex medical decision-making associated with antimicrobial prescribing.  In-depth chart review with high level synthesis for complex diagnoses.  Managed infection treatment protocol associated with transitions of care for this complex patient.           Ricky Lubin MD  2025  06:42 EDT

## 2025-04-09 NOTE — CASE MANAGEMENT/SOCIAL WORK
Discharge Planning Assessment  [facility]     Patient Name: Carlos Simms  MRN: [id number]  Today's Date: 4/9/2025    Admit Date: 4/8/2025    Plan: IDP   Discharge Needs Assessment       Row Name 04/09/25 1123       Living Environment    People in Home spouse    Primary Care Provided by self    Provides Primary Care For no one    Family Caregiver if Needed spouse    Quality of Family Relationships unable to assess    Able to Return to Prior Arrangements yes       Transition Planning    Patient/Family Anticipates Transition to home    Patient/Family Anticipated Services at Transition none    Transportation Anticipated family or friend will provide       Discharge Needs Assessment    Equipment Currently Used at Home none                   Discharge Plan       Row Name 04/09/25 1123       Plan    Plan IDP    Patient/Family in Agreement with Plan yes    Plan Comments  met with Mr. Simms, at bedside. Pt lives with wife in [place] Indepedent with ADL's, still drives. SDOH completed. No DME/HH/OPPT. Pt plans to D/C home, wife to transport. Infectious Disease has been consulted. PCP-Stevan Keane. Confirmed Medicare A&B. Prescriptions filled at [facility].  will continue to follow.    Final Discharge Disposition Code 01 - home or self-care                    Expected Discharge Date and Time       Expected Discharge Date Expected Discharge Time    Apr 10, 2025            Demographic Summary       Row Name 04/09/25 1123       General Information    Reason for Consult discharge planning       Contact Information    Permission Granted to Share Info With                    Functional Status       Row Name 04/09/25 1123       Functional Status    Usual Activity Tolerance good       Functional Status, IADL    Medications independent    Meal Preparation independent    Housekeeping independent    Laundry independent    Shopping independent                   Psychosocial    No  documentation.                  Abuse/Neglect    No documentation.                  Legal    No documentation.                  Substance Abuse    No documentation.                  Patient Forms    No documentation.                     Arcelia Carlson RN

## 2025-04-10 PROCEDURE — 25010000002 CEFTRIAXONE PER 250 MG: Performed by: NURSE PRACTITIONER

## 2025-04-10 PROCEDURE — 25010000002 DAPTOMYCIN PER 1 MG: Performed by: INTERNAL MEDICINE

## 2025-04-10 PROCEDURE — 25010000002 ENOXAPARIN PER 10 MG: Performed by: NURSE PRACTITIONER

## 2025-04-10 PROCEDURE — 99232 SBSQ HOSP IP/OBS MODERATE 35: CPT | Performed by: NURSE PRACTITIONER

## 2025-04-10 RX ADMIN — ENOXAPARIN SODIUM 40 MG: 100 INJECTION SUBCUTANEOUS at 10:06

## 2025-04-10 RX ADMIN — Medication 10 ML: at 20:35

## 2025-04-10 RX ADMIN — AMLODIPINE BESYLATE 5 MG: 5 TABLET ORAL at 10:06

## 2025-04-10 RX ADMIN — FUROSEMIDE 40 MG: 40 TABLET ORAL at 10:06

## 2025-04-10 RX ADMIN — Medication 10 ML: at 10:06

## 2025-04-10 RX ADMIN — CEFTRIAXONE 2000 MG: 2 INJECTION, POWDER, FOR SOLUTION INTRAMUSCULAR; INTRAVENOUS at 10:06

## 2025-04-10 RX ADMIN — LISINOPRIL 40 MG: 20 TABLET ORAL at 10:06

## 2025-04-10 RX ADMIN — DAPTOMYCIN 450 MG: 500 INJECTION, POWDER, LYOPHILIZED, FOR SOLUTION INTRAVENOUS at 10:06

## 2025-04-10 NOTE — PLAN OF CARE
Goal Outcome Evaluation:   Pt V/S is stable. He is A+O X4. PCA pump running. Chest tube in place with output of 210 ml overnight. Straight cath done at 0300. Dressing changed. He is currently resting on chair with call light in reach.

## 2025-04-10 NOTE — PLAN OF CARE
Goal Outcome Evaluation:   Pt VSS. He is A+OX4. Cellulitis site assessed. He is currently on bed with call light in reach.

## 2025-04-10 NOTE — PROGRESS NOTES
"    INFECTIOUS DISEASE Progress Note    Carlos Simms  1950  [id number]      Admission Date: 2025      Requesting Provider: No ref. provider found  Evaluating Physician: Ricky Lubin MD    Reason for Consultation:  left leg cellulitis     History of present illness:    25: Patient is a 74 y.o. male with history of hypertension, benign essential tremor, tinea pedis, and prior left leg cellulitis who is seen today for evaluation of recurrent left leg cellulitis and left foot tinea pedis unresponsive to oral antibiotic therapy.   He presented to his primary care provider, Dr. Keane, yesterday and was instructed to go to the emergency room.  He was noted to have significant left leg erythema and swelling.  Laboratory studies revealed a white blood cell count of 8.3, sedimentation rate of 82, and C-reactive protein of 2.1.  Procalcitonin was 0.06 and lactic acid was 1.4.  Blood cultures were obtained and he was started on intravenous daptomycin and ceftriaxone.  He complains of recurrent tinea pedis and moderate left leg swelling.     4/10/25:   He has remained afebrile.  Blood cultures from  are no growth so far.   He has decreased left leg erythema and swelling.  He denies nausea, vomiting, and diarrhea.    Past Medical History:   Diagnosis Date    Benign essential tremor     Carpal tunnel syndrome on both sides     Essential hypertension     Usually controlled with lifestyle efforts    Osteoarthritis        Past Surgical History:   Procedure Laterality Date    TRIGGER FINGER RELEASE Right     Right thumb    VASECTOMY      VASECTOMY REVERSAL         Family History   Problem Relation Age of Onset    No Known Problems Mother          in her 90s of \"old age\"    Liver cancer Father 65    Leukemia Sister 57    Diabetes type II Brother     Hypertension Brother         Pulmonary hypertension       Social History     Socioeconomic History    Marital status:    Tobacco Use    Smoking status: " Former     Types: Cigarettes    Smokeless tobacco: Never   Vaping Use    Vaping status: Never Used   Substance and Sexual Activity    Alcohol use: Never    Drug use: Never    Sexual activity: Defer       Allergies   Allergen Reactions    Latex Rash         Medication:    Current Facility-Administered Medications:     acetaminophen (TYLENOL) tablet 650 mg, 650 mg, Oral, Q4H PRN, Cony Peoples APRN    amLODIPine (NORVASC) tablet 5 mg, 5 mg, Oral, Daily, Cony Peoples APRN, 5 mg at 04/09/25 1010    sennosides-docusate (PERICOLACE) 8.6-50 MG per tablet 2 tablet, 2 tablet, Oral, BID PRN **AND** polyethylene glycol (MIRALAX) packet 17 g, 17 g, Oral, Daily PRN **AND** bisacodyl (DULCOLAX) EC tablet 5 mg, 5 mg, Oral, Daily PRN **AND** bisacodyl (DULCOLAX) suppository 10 mg, 10 mg, Rectal, Daily PRN, Cony Peoples APRN    cefTRIAXone (ROCEPHIN) 2,000 mg in sodium chloride 0.9 % 100 mL MBP, 2,000 mg, Intravenous, Q24H, Cony Peoples APRN, Last Rate: 200 mL/hr at 04/09/25 1356, 2,000 mg at 04/09/25 1356    DAPTOmycin (CUBICIN) 450 mg in sodium chloride 0.9 % 50 mL IVPB, 6 mg/kg (Adjusted), Intravenous, Q24H, Ricky Lubin MD    enoxaparin sodium (LOVENOX) syringe 40 mg, 40 mg, Subcutaneous, Daily, Cony Peoples APRN    furosemide (LASIX) tablet 40 mg, 40 mg, Oral, Daily, Coyn Peoples APRN, 40 mg at 04/09/25 1010    lisinopril (PRINIVIL,ZESTRIL) tablet 40 mg, 40 mg, Oral, Daily, Cony Peoples APRN, 40 mg at 04/09/25 1009    melatonin tablet 5 mg, 5 mg, Oral, Nightly PRN, Cony Peoples APRN    sodium chloride 0.9 % flush 10 mL, 10 mL, Intravenous, Q12H, Cony Peoples APRN, 10 mL at 04/09/25 2053    sodium chloride 0.9 % flush 10 mL, 10 mL, Intravenous, PRN, Cony Peoples, KATI    sodium chloride 0.9 % infusion 40 mL, 40 mL, Intravenous, PRN, Cony Peoples, KATI    Antibiotics:  Anti-Infectives (From admission, onward)      Ordered     Dose/Rate Route Frequency Start Stop    04/09/25 1518  DAPTOmycin  (CUBICIN) 450 mg in sodium chloride 0.9 % 50 mL IVPB        Ordering Provider: Ricky Lubin MD    6 mg/kg × 74.5 kg (Adjusted)  100 mL/hr over 30 Minutes Intravenous Every 24 Hours 04/10/25 0900 04/12/25 0859    04/08/25 2145  cefTRIAXone (ROCEPHIN) 2,000 mg in sodium chloride 0.9 % 100 mL MBP        Ordering Provider: Cony Peoples APRN    2,000 mg  200 mL/hr over 30 Minutes Intravenous Every 24 Hours 04/09/25 1400 04/14/25 0859    04/08/25 2145  DAPTOmycin (CUBICIN) 450 mg in sodium chloride 0.9 % 50 mL IVPB        Ordering Provider: Ricky Lubin MD    6 mg/kg × 74.5 kg (Adjusted)  100 mL/hr over 30 Minutes Intravenous Every 24 Hours 04/09/25 1400 04/09/25 1722    04/09/25 1237  fluconazole (DIFLUCAN) tablet 200 mg        Ordering Provider: Ricky Lubin MD    200 mg Oral Once 04/09/25 1400 04/09/25 1356    04/08/25 2150  DAPTOmycin (CUBICIN) 450 mg in sodium chloride 0.9 % 50 mL IVPB        Ordering Provider: Nicholas Alva RPH    6 mg/kg × 74.5 kg (Adjusted)  100 mL/hr over 30 Minutes Intravenous Once 04/08/25 2230 04/08/25 2244    04/08/25 1840  DAPTOmycin (CUBICIN) 450 mg in sodium chloride 0.9 % 50 mL IVPB  Status:  Discontinued        Ordering Provider: Ricky Starr MD    6 mg/kg × 74.5 kg (Adjusted)  100 mL/hr over 30 Minutes Intravenous Once 04/08/25 2015 04/08/25 2150    04/08/25 1828  cefTRIAXone (ROCEPHIN) 2,000 mg in sodium chloride 0.9 % 100 mL MBP        Ordering Provider: Ricky Starr MD    2,000 mg  200 mL/hr over 30 Minutes Intravenous Once 04/08/25 2000 04/08/25 2015 04/08/25 1828  vancomycin IVPB 1750 mg in 0.9% Sodium Chloride (premix) 500 mL  Status:  Discontinued        Ordering Provider: Ricky Starr MD    20 mg/kg × 87.1 kg  285.7 mL/hr over 105 Minutes Intravenous Once 04/08/25 1844 04/08/25 1840    04/08/25 1828  metroNIDAZOLE (FLAGYL) tablet 500 mg  Status:  Discontinued        Ordering Provider: Ricky Starr MD    500 mg Oral Once 04/08/25 1849  25              Review of Systems:  Constitutional-- No Fever, chills or sweats.  Appetite good, and no malaise. No fatigue.  HEENT-- No new vision, hearing or throat complaints.  No epistaxis or oral sores.  Denies odynophagia or dysphagia. No headache, photophobia or neck stiffness.  CV--   Hypertension  Resp-- No SOB/cough/Hemoptysis  GI- No nausea, vomiting, or diarrhea.  No hematochezia, melena, or hematemesis. Denies jaundice or chronic liver disease.  -- No dysuria, hematuria, or flank pain.   Heme- No active bruising or bleeding  MS--left leg swelling and erythema.  Neuro-- No acute focal weakness or numbness in the arms or legs.  No seizures.  Skin-- left leg swelling and erythema.  History of tinea pedis.      Physical Exam:   Vital Signs  Temp (24hrs), Av.9 °F (36.6 °C), Min:97.7 °F (36.5 °C), Max:98.1 °F (36.7 °C)    Temp  Min: 97.7 °F (36.5 °C)  Max: 98.1 °F (36.7 °C)  BP  Min: 116/53  Max: 153/92  Pulse  Min: 57  Max: 88  Resp  Min: 16  Max: 18  SpO2  Min: 93 %  Max: 96 %    GENERAL: Awake and alert, in no acute distress.   HEENT: Normocephalic, atraumatic.  PERRL. EOMI. No conjunctival injection. No icterus. Oropharynx clear without evidence of thrush or exudate.   NECK: Supple   HEART: RRR; No murmur, rubs, gallops.   LUNGS: Clear to auscultation bilaterally without wheezing, rales, rhonchi. Normal respiratory effort. Nonlabored.   ABDOMEN: Soft, nontender, nondistended.  No rebound or guarding. NO mass or HSM.  EXT:      Decreased moderate left leg swelling and erythema.  He has moderately extensive left foot tinea pedis which is most severe in the fourth webspace.  :  Without Oconnor catheter.  MSK: No joint effusions or erythema  SKIN: Warm and dry without cutaneous eruptions on Inspection/palpation.    NEURO: Oriented to PPT.  Motor 5/5 strength  PSYCHIATRIC: Normal insight and judgment. Cooperative with PE    Laboratory Data    Results from last 7 days   Lab Units 25  2280  "04/08/25 1901   WBC 10*3/mm3 7.58 8.27   HEMOGLOBIN g/dL 14.0 14.1   HEMATOCRIT % 41.3 41.1   PLATELETS 10*3/mm3 356 348     Results from last 7 days   Lab Units 04/09/25  1031   SODIUM mmol/L 139   POTASSIUM mmol/L 4.1   CHLORIDE mmol/L 104   CO2 mmol/L 24.0   BUN mg/dL 15   CREATININE mg/dL 1.03   GLUCOSE mg/dL 100*   CALCIUM mg/dL 9.2     Results from last 7 days   Lab Units 04/08/25  1901   ALK PHOS U/L 106   BILIRUBIN mg/dL 0.4   ALT (SGPT) U/L 17   AST (SGOT) U/L 22     Results from last 7 days   Lab Units 04/08/25  1901   SED RATE mm/hr 82*     Results from last 7 days   Lab Units 04/08/25  1901   CRP mg/dL 2.12*     Results from last 7 days   Lab Units 04/08/25  1901   LACTATE mmol/L 1.4     Results from last 7 days   Lab Units 04/08/25  1901   CK TOTAL U/L 98         Estimated Creatinine Clearance: 65.3 mL/min (by C-G formula based on SCr of 1.03 mg/dL).      Microbiology:  No results found for: \"ACANTHNAEG\", \"AFBCX\", \"BPERTUSSISCX\", \"BLOODCX\"  No results found for: \"BCIDPCR\", \"CXREFLEX\", \"CSFCX\", \"CULTURETIS\"  No results found for: \"CULTURES\", \"HSVCX\", \"URCX\"  No results found for: \"EYECULTURE\", \"GCCX\", \"HSVCULTURE\", \"LABHSV\"  No results found for: \"LEGIONELLA\", \"MRSACX\", \"MUMPSCX\", \"MYCOPLASCX\"  No results found for: \"NOCARDIACX\", \"STOOLCX\"  No results found for: \"THROATCX\", \"UNSTIMCULT\", \"URINECX\", \"CULTURE\", \"VZVCULTUR\"  No results found for: \"VIRALCULTU\", \"WOUNDCX\"        Radiology:  Imaging Results (Last 72 Hours)       Procedure Component Value Units Date/Time    XR Foot 3+ View Left [[id number]] Collected: 04/08/25 2008     Updated: 04/08/25 2235    Addenda:        ADDENDUM #1  Please disregard the typographical error in the report. Report should read   as follows:    FINDINGS: There is no radiographic evidence of acute fracture or   dislocation. Joint spaces are preserved. There is marked diffuse left foot   soft tissue swelling. No radiopaque foreign bodies visualized.    IMPRESSION: Marked " diffuse left foot soft tissue swelling. Correlate for   cellulitis. No radiopaque foreign bodies visualized. No acute fracture.    Discussed with Dr. Starr.    Electronically Signed: Adrian Blackwood MD    4/8/2025 10:34 PM EDT    Workstation ID: [id number]  ORIGINAL REPORT:  XR FOOT 3+ VW LEFT    Date of Exam: 4/8/2025 7:34 PM EDT    Indication: Consider foreign body with cellulitis    Comparison: None available.    Findings:  There is no radiographic evidence of acute fracture or dislocation. Joint   spaces are preserved. There is marked diffuse left shoulder soft tissue   swelling. No radiopaque foreign bodies visualized.    Impression:  Marked diffuse left shoulder soft tissue swelling. Correlate for   cellulitis. No radiopaque foreign bodies visualized. No acute fracture.        Electronically Signed: Adrian Blackwood MD    4/8/2025 8:09 PM EDT    Workstation ID: [id number]  Signed: 04/08/25 2234 by Adrian Blackwood MD    Narrative:      XR FOOT 3+ VW LEFT    Date of Exam: 4/8/2025 7:34 PM EDT    Indication: Consider foreign body with cellulitis    Comparison: None available.    Findings:  There is no radiographic evidence of acute fracture or dislocation. Joint spaces are preserved. There is marked diffuse left shoulder soft tissue swelling. No radiopaque foreign bodies visualized.      Impression:      Impression:  Marked diffuse left shoulder soft tissue swelling. Correlate for cellulitis. No radiopaque foreign bodies visualized. No acute fracture.        Electronically Signed: Adrian Blackwood MD    4/8/2025 8:09 PM EDT    Workstation ID: [id number]    XR Tibia Fibula 2 View Left [[id number]] Collected: 04/08/25 2007     Updated: 04/08/25 2011    Narrative:      XR TIBIA FIBULA 2 VW LEFT    Date of Exam: 4/8/2025 7:34 PM EDT    Indication: Consider foreign body with lower extremity cellulitis    Comparison: None available.    Findings:  There is no radiographic evidence of acute fracture or dislocation. Joint  spaces are preserved. Diffuse soft tissue swelling around the left tibia and fibula is visualized. No radiopaque foreign bodies visualized.      Impression:      Impression:  Diffuse soft tissue swelling around the left tibia and fibula. Correlate for cellulitis. No radiopaque foreign bodies visualized. No acute fracture.        Electronically Signed: Adrian Blackwood MD    4/8/2025 8:08 PM EDT    Workstation ID: [id number]              Impression:   1.  Left leg cellulitis-refractory to oral antibiotic therapy.  This is most likely staphylococcal or streptococcal in origin.  The portal of entry is likely his fairly extensive left foot tinea pedis and he will be at risk for recurrent cellulitis if we do not resolve the tinea pedis.  2.  Left foot tinea pedis-this has been difficult to control.  His tinea pedis is the likely portal of entry for his cellulitis.  I will start him on fluconazole at 200 mg p.o. weekly x 6 weeks along with topical butenafine    PLAN/RECOMMENDATIONS:   1. Butenafine (Lotrimin ultra) twice daily to tinea pedis  2.  Fluconazole 200 mg p.o. weekly x 6 weeks  3.  Intravenous daptomycin  4.  Intravenous ceftriaxone  5.  Blood cultures-pending     He is clinically improving.  He will require several days of outpatient intravenous antibiotic therapy.  He may be ready for discharge soon with ongoing outpatient intravenous antibiotic therapy daily in our office.  I discussed his complex situation and potential disposition with him in detail today.      This visit included the following complex service elements:  Complex medical decision-making associated with antimicrobial prescribing.  In-depth chart review with high level synthesis for complex diagnoses.  Managed infection treatment protocol associated with transitions of care for this complex patient.         Ricky Lubin MD  4/10/2025  07:22 EDT

## 2025-04-10 NOTE — PROGRESS NOTES
[facility]  PROGRESS NOTE    Patient Name: Carlos Simms  : 1950  MRN: [id number]    Date of Admission: 2025  Primary Care Physician: Stevan Keane MD    Subjective   Subjective     CC:  F/U LLE cellulitis     HPI:  Seen resting up in bed in no acute distress.  Awake and alert.  No visitors at bedside.  Currently he states his left lower extremity erythema and edema have improved.  Denies pain, nausea, vomiting.  Tolerating diet.  Does not feel ready for discharge home today but hopes to go home tomorrow after he receives his IV antibiotic dose      Objective   Objective     Vital Signs:   Temp:  [97.7 °F (36.5 °C)-98.1 °F (36.7 °C)] 97.8 °F (36.6 °C)  Heart Rate:  [61-88] 61  Resp:  [16-18] 16  BP: (116-135)/(53-80) 131/69     Physical Exam:  Constitutional: No acute distress, awake, alert.  Resting up in bed.  HENT: NCAT, mucous membranes moist  Respiratory: Clear to auscultation bilaterally, respiratory effort normal on room air.  Sats WNL.  Cardiovascular: RRR, no murmurs, rubs, or gallops  Gastrointestinal: Positive bowel sounds, soft, nontender, nondistended  Musculoskeletal: LLE edema, erythema and warmth noted.  (Reports much better than it had been).  No RLE edema.  BANEGAS spontaneously.  Psychiatric: Appropriate affect, cooperative  Neurologic: Oriented x 3, strength symmetric in all extremities, Cranial Nerves grossly intact to confrontation, speech clear  Skin: per above, small wound noted between L 4th/5th toes     Results Reviewed:  LAB RESULTS:      Lab 25  1031 25  2212 25  1901   WBC 7.58  --  8.27   HEMOGLOBIN 14.0  --  14.1   HEMATOCRIT 41.3  --  41.1   PLATELETS 356  --  348   NEUTROS ABS 4.21  --  4.92   IMMATURE GRANS (ABS) 0.03  --  0.04   LYMPHS ABS 2.37  --  2.31   MONOS ABS 0.64  --  0.69   EOS ABS 0.24  --  0.23   MCV 90.8  --  90.5   SED RATE  --   --  82*   CRP  --   --  2.12*   PROCALCITONIN  --   --  0.06    LACTATE  --   --  1.4   D DIMER QUANT  --  2.00*  --          Lab 04/09/25  1031 04/08/25  1901   SODIUM 139 137   POTASSIUM 4.1 4.8   CHLORIDE 104 101   CO2 24.0 26.0   ANION GAP 11.0 10.0   BUN 15 20   CREATININE 1.03 1.15   EGFR 75.8 66.4   GLUCOSE 100* 103*   CALCIUM 9.2 9.5         Lab 04/08/25  1901   TOTAL PROTEIN 8.0   ALBUMIN 3.8   GLOBULIN 4.2   ALT (SGPT) 17   AST (SGOT) 22   BILIRUBIN 0.4   ALK PHOS 106         Lab 04/08/25  1901   PROBNP 246.4                 Brief Urine Lab Results  (Last result in the past 365 days)        Color   Clarity   Blood   Leuk Est   Nitrite   Protein   CREAT   Urine HCG        04/08/25 2316 Yellow   Clear   Negative   Negative   Negative   Negative                   Microbiology Results Abnormal       None            Duplex Venous Lower Extremity - LEFT  Result Date: 4/9/2025    The left lower extremity venous duplex scan is negative for evidence of DVT and SVT.     XR Foot 3+ View Left  Addendum Date: 4/8/2025  ADDENDUM #1 Please disregard the typographical error in the report. Report should read as follows: FINDINGS: There is no radiographic evidence of acute fracture or dislocation. Joint spaces are preserved. There is marked diffuse left foot soft tissue swelling. No radiopaque foreign bodies visualized. IMPRESSION: Marked diffuse left foot soft tissue swelling. Correlate for cellulitis. No radiopaque foreign bodies visualized. No acute fracture. Discussed with Dr. Starr. Electronically Signed: Adrian Blackwood MD  4/8/2025 10:34 PM EDT  Workstation ID: [id number] ORIGINAL REPORT: XR FOOT 3+ VW LEFT Date of Exam: 4/8/2025 7:34 PM EDT Indication: Consider foreign body with cellulitis Comparison: None available. Findings: There is no radiographic evidence of acute fracture or dislocation. Joint spaces are preserved. There is marked diffuse left shoulder soft tissue swelling. No radiopaque foreign bodies visualized. Impression: Marked diffuse left shoulder soft tissue  swelling. Correlate for cellulitis. No radiopaque foreign bodies visualized. No acute fracture. Electronically Signed: Adrian Blackwood MD  4/8/2025 8:09 PM EDT  Workstation ID: [id number]    Result Date: 4/8/2025  XR FOOT 3+ VW LEFT Date of Exam: 4/8/2025 7:34 PM EDT Indication: Consider foreign body with cellulitis Comparison: None available. Findings: There is no radiographic evidence of acute fracture or dislocation. Joint spaces are preserved. There is marked diffuse left shoulder soft tissue swelling. No radiopaque foreign bodies visualized.     Impression: Impression: Marked diffuse left shoulder soft tissue swelling. Correlate for cellulitis. No radiopaque foreign bodies visualized. No acute fracture. Electronically Signed: Adrian Blackwood MD  4/8/2025 8:09 PM EDT  Workstation ID: [id number]    XR Tibia Fibula 2 View Left  Result Date: 4/8/2025  XR TIBIA FIBULA 2 VW LEFT Date of Exam: 4/8/2025 7:34 PM EDT Indication: Consider foreign body with lower extremity cellulitis Comparison: None available. Findings: There is no radiographic evidence of acute fracture or dislocation. Joint spaces are preserved. Diffuse soft tissue swelling around the left tibia and fibula is visualized. No radiopaque foreign bodies visualized.     Impression: Impression: Diffuse soft tissue swelling around the left tibia and fibula. Correlate for cellulitis. No radiopaque foreign bodies visualized. No acute fracture. Electronically Signed: Adrian Blackwood MD  4/8/2025 8:08 PM EDT  Workstation ID: [id number]          Current medications:  Scheduled Meds:amLODIPine, 5 mg, Oral, Daily  cefTRIAXone, 2,000 mg, Intravenous, Q24H  DAPTOmycin, 6 mg/kg (Adjusted), Intravenous, Q24H  enoxaparin sodium, 40 mg, Subcutaneous, Daily  furosemide, 40 mg, Oral, Daily  lisinopril, 40 mg, Oral, Daily  sodium chloride, 10 mL, Intravenous, Q12H      Continuous Infusions:   PRN Meds:.  acetaminophen    senna-docusate sodium **AND** polyethylene glycol **AND**  bisacodyl **AND** bisacodyl    melatonin    sodium chloride    sodium chloride    Assessment & Plan   Assessment & Plan     Active Hospital Problems    Diagnosis  POA    **Cellulitis [L03.90]  Yes    HTN (hypertension) [I10]  Yes      Resolved Hospital Problems   No resolved problems to display.        Brief Hospital Course to date:  Carlos Simms is a 74 y.o. male w/ a hx of HTN, benign essential tremor and osteoarthritis who presented to the ED w/ c/o left left swelling.     This patient's problems and plans were partially entered by my partner and updated as appropriate by me 04/10/25.     Assessment/plan:  Pt is new to me today      LLE Cellulitis   -developed erythema, edema around January 1st 2025   -s/p 2 rounds of antibiotics since January   -Prelim duplex report negative for DVT  -Continue Rocephin + Dapto  -ID consulted.  --Labs stable yest with white count normal at 7.5.  --monitor labs     HTN   -continue routine Norvasc, Lasix and Lisinopril w/ hold parameters   --stable     Expected Discharge Location and Transportation: Home  Expected Discharge   Expected Discharge Date: 4/11/2025; Expected Discharge Time:      VTE Prophylaxis:  Pharmacologic VTE prophylaxis orders are present.         AM-PAC 6 Clicks Score (PT): 22 (04/09/25 2000)    CODE STATUS:   Code Status and Medical Interventions: CPR (Attempt to Resuscitate); Full Support   Ordered at: 04/08/25 2127     Code Status (Patient has no pulse and is not breathing):    CPR (Attempt to Resuscitate)     Medical Interventions (Patient has pulse or is breathing):    Full Support       Gala Perkins, KATI  04/10/25

## 2025-04-11 ENCOUNTER — READMISSION MANAGEMENT (OUTPATIENT)
Dept: CALL CENTER | Facility: HOSPITAL | Age: 75
End: 2025-04-11
Payer: MEDICARE

## 2025-04-11 VITALS
HEIGHT: 67 IN | WEIGHT: 192 LBS | BODY MASS INDEX: 30.13 KG/M2 | OXYGEN SATURATION: 96 % | HEART RATE: 72 BPM | TEMPERATURE: 97.7 F | RESPIRATION RATE: 16 BRPM | SYSTOLIC BLOOD PRESSURE: 156 MMHG | DIASTOLIC BLOOD PRESSURE: 80 MMHG

## 2025-04-11 LAB
BASOPHILS # BLD AUTO: 0.09 10*3/MM3 (ref 0–0.2)
BASOPHILS NFR BLD AUTO: 0.9 % (ref 0–1.5)
DEPRECATED RDW RBC AUTO: 43.8 FL (ref 37–54)
EOSINOPHIL # BLD AUTO: 0.35 10*3/MM3 (ref 0–0.4)
EOSINOPHIL NFR BLD AUTO: 3.5 % (ref 0.3–6.2)
ERYTHROCYTE [DISTWIDTH] IN BLOOD BY AUTOMATED COUNT: 12.8 % (ref 12.3–15.4)
HCT VFR BLD AUTO: 40.5 % (ref 37.5–51)
HGB BLD-MCNC: 13.9 G/DL (ref 13–17.7)
IMM GRANULOCYTES # BLD AUTO: 0.06 10*3/MM3 (ref 0–0.05)
IMM GRANULOCYTES NFR BLD AUTO: 0.6 % (ref 0–0.5)
LYMPHOCYTES # BLD AUTO: 3.22 10*3/MM3 (ref 0.7–3.1)
LYMPHOCYTES NFR BLD AUTO: 32.5 % (ref 19.6–45.3)
MCH RBC QN AUTO: 32 PG (ref 26.6–33)
MCHC RBC AUTO-ENTMCNC: 34.3 G/DL (ref 31.5–35.7)
MCV RBC AUTO: 93.1 FL (ref 79–97)
MONOCYTES # BLD AUTO: 0.74 10*3/MM3 (ref 0.1–0.9)
MONOCYTES NFR BLD AUTO: 7.5 % (ref 5–12)
NEUTROPHILS NFR BLD AUTO: 5.44 10*3/MM3 (ref 1.7–7)
NEUTROPHILS NFR BLD AUTO: 55 % (ref 42.7–76)
NRBC BLD AUTO-RTO: 0 /100 WBC (ref 0–0.2)
PLATELET # BLD AUTO: 354 10*3/MM3 (ref 140–450)
PMV BLD AUTO: 9.5 FL (ref 6–12)
RBC # BLD AUTO: 4.35 10*6/MM3 (ref 4.14–5.8)
WBC NRBC COR # BLD AUTO: 9.9 10*3/MM3 (ref 3.4–10.8)

## 2025-04-11 PROCEDURE — 25010000002 CEFTRIAXONE PER 250 MG: Performed by: NURSE PRACTITIONER

## 2025-04-11 PROCEDURE — 25010000002 ENOXAPARIN PER 10 MG: Performed by: NURSE PRACTITIONER

## 2025-04-11 PROCEDURE — 25010000002 DAPTOMYCIN PER 1 MG: Performed by: INTERNAL MEDICINE

## 2025-04-11 PROCEDURE — 99239 HOSP IP/OBS DSCHRG MGMT >30: CPT | Performed by: NURSE PRACTITIONER

## 2025-04-11 PROCEDURE — 85025 COMPLETE CBC W/AUTO DIFF WBC: CPT | Performed by: NURSE PRACTITIONER

## 2025-04-11 RX ORDER — ACETAMINOPHEN 325 MG/1
650 TABLET ORAL EVERY 4 HOURS PRN
Start: 2025-04-11

## 2025-04-11 RX ORDER — POLYETHYLENE GLYCOL 3350 17 G/17G
17 POWDER, FOR SOLUTION ORAL DAILY PRN
Start: 2025-04-11

## 2025-04-11 RX ADMIN — DAPTOMYCIN 450 MG: 500 INJECTION, POWDER, LYOPHILIZED, FOR SOLUTION INTRAVENOUS at 09:45

## 2025-04-11 RX ADMIN — ENOXAPARIN SODIUM 40 MG: 100 INJECTION SUBCUTANEOUS at 09:45

## 2025-04-11 RX ADMIN — FUROSEMIDE 40 MG: 40 TABLET ORAL at 09:45

## 2025-04-11 RX ADMIN — Medication 10 ML: at 09:46

## 2025-04-11 RX ADMIN — AMLODIPINE BESYLATE 5 MG: 5 TABLET ORAL at 09:45

## 2025-04-11 RX ADMIN — CEFTRIAXONE 2000 MG: 2 INJECTION, POWDER, FOR SOLUTION INTRAMUSCULAR; INTRAVENOUS at 09:45

## 2025-04-11 RX ADMIN — LISINOPRIL 40 MG: 20 TABLET ORAL at 09:45

## 2025-04-11 NOTE — PROGRESS NOTES
INFECTIOUS DISEASE Progress Note    Carlos Simms  1950  [id number]      Admission Date: 4/8/2025      Requesting Provider: No ref. provider found  Evaluating Physician: Ricky Lubin MD    Reason for Consultation:  left leg cellulitis     History of present illness:    4/9/25: Patient is a 74 y.o. male with history of hypertension, benign essential tremor, tinea pedis, and prior left leg cellulitis who is seen today for evaluation of recurrent left leg cellulitis and left foot tinea pedis unresponsive to oral antibiotic therapy.   He presented to his primary care provider, Dr. Keane, yesterday and was instructed to go to the emergency room.  He was noted to have significant left leg erythema and swelling.  Laboratory studies revealed a white blood cell count of 8.3, sedimentation rate of 82, and C-reactive protein of 2.1.  Procalcitonin was 0.06 and lactic acid was 1.4.  Blood cultures were obtained and he was started on intravenous daptomycin and ceftriaxone.  He complains of recurrent tinea pedis and moderate left leg swelling.     4/10/25:   He has remained afebrile.  Blood cultures from 4/8 are no growth so far.   He has decreased left leg erythema and swelling.  He denies nausea, vomiting, and diarrhea.    4/11/25:  He remains afebrile.  Blood cultures are no growth so far.     White blood cell count is 9.9.  He feels better.  He has decreased leg pain.  He denies nausea and vomiting.  He has decreased foot discomfort.  He has started on his topical Lotrimin ultra he would like to go home today.    Past Medical History:   Diagnosis Date    Benign essential tremor     Carpal tunnel syndrome on both sides     Essential hypertension     Usually controlled with lifestyle efforts    Osteoarthritis        Past Surgical History:   Procedure Laterality Date    TRIGGER FINGER RELEASE Right     Right thumb    VASECTOMY      VASECTOMY REVERSAL         Family History   Problem Relation Age of Onset    No  "Known Problems Mother          in her 90s of \"old age\"    Liver cancer Father 65    Leukemia Sister 57    Diabetes type II Brother     Hypertension Brother         Pulmonary hypertension       Social History     Socioeconomic History    Marital status:    Tobacco Use    Smoking status: Former     Types: Cigarettes    Smokeless tobacco: Never   Vaping Use    Vaping status: Never Used   Substance and Sexual Activity    Alcohol use: Never    Drug use: Never    Sexual activity: Defer       Allergies   Allergen Reactions    Latex Rash         Medication:    Current Facility-Administered Medications:     acetaminophen (TYLENOL) tablet 650 mg, 650 mg, Oral, Q4H PRN, Cony Peoples APRN    amLODIPine (NORVASC) tablet 5 mg, 5 mg, Oral, Daily, Cony Peoples APRN, 5 mg at 04/10/25 100    sennosides-docusate (PERICOLACE) 8.6-50 MG per tablet 2 tablet, 2 tablet, Oral, BID PRN **AND** polyethylene glycol (MIRALAX) packet 17 g, 17 g, Oral, Daily PRN **AND** bisacodyl (DULCOLAX) EC tablet 5 mg, 5 mg, Oral, Daily PRN **AND** bisacodyl (DULCOLAX) suppository 10 mg, 10 mg, Rectal, Daily PRN, Cony Peoples APRN    cefTRIAXone (ROCEPHIN) 2,000 mg in sodium chloride 0.9 % 100 mL MBP, 2,000 mg, Intravenous, Q24H, Cony Peoples APRN, Last Rate: 200 mL/hr at 04/10/25 1006, 2,000 mg at 04/10/25 1006    DAPTOmycin (CUBICIN) 450 mg in sodium chloride 0.9 % 50 mL IVPB, 6 mg/kg (Adjusted), Intravenous, Q24H, Ricky Lubin MD, Last Rate: 100 mL/hr at 04/10/25 1006, 450 mg at 04/10/25 1006    enoxaparin sodium (LOVENOX) syringe 40 mg, 40 mg, Subcutaneous, Daily, Cony Peoples APRN, 40 mg at 04/10/25 100    furosemide (LASIX) tablet 40 mg, 40 mg, Oral, Daily, Cony Peoples APRN, 40 mg at 04/10/25 1006    lisinopril (PRINIVIL,ZESTRIL) tablet 40 mg, 40 mg, Oral, Daily, Cony Peoples APRN, 40 mg at 04/10/25 1006    melatonin tablet 5 mg, 5 mg, Oral, Nightly PRN, Cony Peoples APRN    sodium chloride 0.9 % flush 10 mL, " 10 mL, Intravenous, Q12H, Cony Peoples APRN, 10 mL at 04/10/25 2035    sodium chloride 0.9 % flush 10 mL, 10 mL, Intravenous, PRN, Cony Peoples APRN    sodium chloride 0.9 % infusion 40 mL, 40 mL, Intravenous, PRN, Cony Peoples APRN    Antibiotics:  Anti-Infectives (From admission, onward)      Ordered     Dose/Rate Route Frequency Start Stop    04/09/25 1518  DAPTOmycin (CUBICIN) 450 mg in sodium chloride 0.9 % 50 mL IVPB        Ordering Provider: Ricky Lubin MD    6 mg/kg × 74.5 kg (Adjusted)  100 mL/hr over 30 Minutes Intravenous Every 24 Hours 04/10/25 0900 04/12/25 0859    04/08/25 2145  cefTRIAXone (ROCEPHIN) 2,000 mg in sodium chloride 0.9 % 100 mL MBP        Ordering Provider: Cony Peoples APRN    2,000 mg  200 mL/hr over 30 Minutes Intravenous Every 24 Hours 04/09/25 1400 04/14/25 0859    04/08/25 2145  DAPTOmycin (CUBICIN) 450 mg in sodium chloride 0.9 % 50 mL IVPB        Ordering Provider: Ricky Lubin MD    6 mg/kg × 74.5 kg (Adjusted)  100 mL/hr over 30 Minutes Intravenous Every 24 Hours 04/09/25 1400 04/09/25 1722    04/09/25 1237  fluconazole (DIFLUCAN) tablet 200 mg        Ordering Provider: Ricky Lubin MD    200 mg Oral Once 04/09/25 1400 04/09/25 1356    04/08/25 2150  DAPTOmycin (CUBICIN) 450 mg in sodium chloride 0.9 % 50 mL IVPB        Ordering Provider: Nicholas Alva RPH    6 mg/kg × 74.5 kg (Adjusted)  100 mL/hr over 30 Minutes Intravenous Once 04/08/25 2230 04/08/25 2244    04/08/25 1840  DAPTOmycin (CUBICIN) 450 mg in sodium chloride 0.9 % 50 mL IVPB  Status:  Discontinued        Ordering Provider: Ricky Starr MD    6 mg/kg × 74.5 kg (Adjusted)  100 mL/hr over 30 Minutes Intravenous Once 04/08/25 2015 04/08/25 2150    04/08/25 1828  cefTRIAXone (ROCEPHIN) 2,000 mg in sodium chloride 0.9 % 100 mL MBP        Ordering Provider: Ricky Starr MD    2,000 mg  200 mL/hr over 30 Minutes Intravenous Once 04/08/25 2000 04/08/25 2015 04/08/25 1828   vancomycin IVPB 1750 mg in 0.9% Sodium Chloride (premix) 500 mL  Status:  Discontinued        Ordering Provider: Ricky Starr MD    20 mg/kg × 87.1 kg  285.7 mL/hr over 105 Minutes Intravenous Once 25  metroNIDAZOLE (FLAGYL) tablet 500 mg  Status:  Discontinued        Ordering Provider: Ricky Starr MD    500 mg Oral Once 25 184              Review of Systems:  \See HPI      Physical Exam:   Vital Signs  Temp (24hrs), Av.8 °F (36.6 °C), Min:97.6 °F (36.4 °C), Max:98 °F (36.7 °C)    Temp  Min: 97.6 °F (36.4 °C)  Max: 98 °F (36.7 °C)  BP  Min: 100/51  Max: 136/66  Pulse  Min: 57  Max: 79  Resp  Min: 16  Max: 16  SpO2  Min: 93 %  Max: 98 %    GENERAL: Awake and alert, in no acute distress.   HEENT: Normocephalic, atraumatic.  PERRL. EOMI. No conjunctival injection. No icterus. Oropharynx clear without evidence of thrush or exudate.   NECK: Supple   HEART: RRR; No murmur, rubs, gallops.   LUNGS: Clear to auscultation bilaterally without wheezing, rales, rhonchi. Normal respiratory effort. Nonlabored.   ABDOMEN: Soft, nontender, nondistended.  No rebound or guarding. NO mass or HSM.  EXT:      Decreased moderate left leg swelling and erythema.  He has moderately extensive left foot tinea pedis which is most severe in the fourth webspace.  :  Without Oconnor catheter.  MSK: No joint effusions or erythema  SKIN: Warm and dry without cutaneous eruptions on Inspection/palpation.    NEURO: Oriented to PPT.  Motor 5/5 strength  PSYCHIATRIC: Normal insight and judgment. Cooperative with PE    Laboratory Data    Results from last 7 days   Lab Units 25  0619 25  1031 25  1901   WBC 10*3/mm3 9.90 7.58 8.27   HEMOGLOBIN g/dL 13.9 14.0 14.1   HEMATOCRIT % 40.5 41.3 41.1   PLATELETS 10*3/mm3 354 356 348     Results from last 7 days   Lab Units 25  1031   SODIUM mmol/L 139   POTASSIUM mmol/L 4.1   CHLORIDE mmol/L 104   CO2 mmol/L 24.0  "  BUN mg/dL 15   CREATININE mg/dL 1.03   GLUCOSE mg/dL 100*   CALCIUM mg/dL 9.2     Results from last 7 days   Lab Units 04/08/25  1901   ALK PHOS U/L 106   BILIRUBIN mg/dL 0.4   ALT (SGPT) U/L 17   AST (SGOT) U/L 22     Results from last 7 days   Lab Units 04/08/25  1901   SED RATE mm/hr 82*     Results from last 7 days   Lab Units 04/08/25  1901   CRP mg/dL 2.12*     Results from last 7 days   Lab Units 04/08/25  1901   LACTATE mmol/L 1.4     Results from last 7 days   Lab Units 04/08/25  1901   CK TOTAL U/L 98         Estimated Creatinine Clearance: 65.3 mL/min (by C-G formula based on SCr of 1.03 mg/dL).      Microbiology:  No results found for: \"ACANTHNAEG\", \"AFBCX\", \"BPERTUSSISCX\", \"BLOODCX\"  No results found for: \"BCIDPCR\", \"CXREFLEX\", \"CSFCX\", \"CULTURETIS\"  No results found for: \"CULTURES\", \"HSVCX\", \"URCX\"  No results found for: \"EYECULTURE\", \"GCCX\", \"HSVCULTURE\", \"LABHSV\"  No results found for: \"LEGIONELLA\", \"MRSACX\", \"MUMPSCX\", \"MYCOPLASCX\"  No results found for: \"NOCARDIACX\", \"STOOLCX\"  No results found for: \"THROATCX\", \"UNSTIMCULT\", \"URINECX\", \"CULTURE\", \"VZVCULTUR\"  No results found for: \"VIRALCULTU\", \"WOUNDCX\"        Radiology:  Imaging Results (Last 72 Hours)       Procedure Component Value Units Date/Time    XR Foot 3+ View Left [[id number]] Collected: 04/08/25 2008     Updated: 04/08/25 2638    Addenda:        ADDENDUM #1  Please disregard the typographical error in the report. Report should read   as follows:    FINDINGS: There is no radiographic evidence of acute fracture or   dislocation. Joint spaces are preserved. There is marked diffuse left foot   soft tissue swelling. No radiopaque foreign bodies visualized.    IMPRESSION: Marked diffuse left foot soft tissue swelling. Correlate for   cellulitis. No radiopaque foreign bodies visualized. No acute fracture.    Discussed with Dr. Starr.    Electronically Signed: Adrian Blackwood MD    4/8/2025 10:34 PM EDT    Workstation ID: [id number]  ORIGINAL " REPORT:  XR FOOT 3+ VW LEFT    Date of Exam: 4/8/2025 7:34 PM EDT    Indication: Consider foreign body with cellulitis    Comparison: None available.    Findings:  There is no radiographic evidence of acute fracture or dislocation. Joint   spaces are preserved. There is marked diffuse left shoulder soft tissue   swelling. No radiopaque foreign bodies visualized.    Impression:  Marked diffuse left shoulder soft tissue swelling. Correlate for   cellulitis. No radiopaque foreign bodies visualized. No acute fracture.        Electronically Signed: Adrian Blackwood MD    4/8/2025 8:09 PM EDT    Workstation ID: [id number]  Signed: 04/08/25 2234 by Adrian Blackwood MD    Narrative:      XR FOOT 3+ VW LEFT    Date of Exam: 4/8/2025 7:34 PM EDT    Indication: Consider foreign body with cellulitis    Comparison: None available.    Findings:  There is no radiographic evidence of acute fracture or dislocation. Joint spaces are preserved. There is marked diffuse left shoulder soft tissue swelling. No radiopaque foreign bodies visualized.      Impression:      Impression:  Marked diffuse left shoulder soft tissue swelling. Correlate for cellulitis. No radiopaque foreign bodies visualized. No acute fracture.        Electronically Signed: Adrian Blackwood MD    4/8/2025 8:09 PM EDT    Workstation ID: [id number]    XR Tibia Fibula 2 View Left [[id number]] Collected: 04/08/25 2007     Updated: 04/08/25 2011    Narrative:      XR TIBIA FIBULA 2 VW LEFT    Date of Exam: 4/8/2025 7:34 PM EDT    Indication: Consider foreign body with lower extremity cellulitis    Comparison: None available.    Findings:  There is no radiographic evidence of acute fracture or dislocation. Joint spaces are preserved. Diffuse soft tissue swelling around the left tibia and fibula is visualized. No radiopaque foreign bodies visualized.      Impression:      Impression:  Diffuse soft tissue swelling around the left tibia and fibula. Correlate for cellulitis. No  radiopaque foreign bodies visualized. No acute fracture.        Electronically Signed: Adrian Blackwood MD    4/8/2025 8:08 PM EDT    Workstation ID: [id number]              Impression:   1.  Left leg cellulitis-refractory to oral antibiotic therapy.  This is most likely staphylococcal or streptococcal in origin.  The portal of entry is likely his fairly extensive left foot tinea pedis and he will be at risk for recurrent cellulitis if we do not resolve the tinea pedis.  He is clinically improved on daptomycin and ceftriaxone.  I will plan for him to discharge today and receive outpatient intravenous antibiotic therapy daily in our office for several days.  2.  Left foot tinea pedis-this has been difficult to control.  His tinea pedis is the likely portal of entry for his cellulitis.  I will start him on fluconazole at 200 mg p.o. weekly x 6 weeks along with topical butenafine    PLAN/RECOMMENDATIONS:   1.  Butenafine (Lotrimin ultra) twice daily to tinea pedis  2.  Fluconazole 200 mg p.o. weekly x 6 weeks  3.  Intravenous daptomycin  4.  Intravenous ceftriaxone  5.  Discharge home today  6.  Acute outpatient care in our office starting tomorrow with IV daptomycin/ceftriaxone      I will have him discharged today after his doses of intravenous daptomycin and ceftriaxone.  I have asked the nursing staff to give him the doses prior to his discharge.  He will start acute outpatient care/outpatient intravenous antibiotic therapy daily in our office starting tomorrow Saturday 4/12 at 9:30.  We will infuse with a peripheral IV.  I discussed his disposition in detail with him today.  I discussed his disposition with Dr. Riojas today.  I discussed his disposition and follow-up with the [facility] staff today.  I coordinated his care    Outpatient orders:  1. Have him come to our office  Saturday 4/12 at 9:30 for: Daptomycin 500 mg IV daily and Rocephin 2 g IV daily  2. Appointment to see me on Thursday 4/17 at 9:30-this has been  arranged   3.  CBC, CMP, ESR, CRP and CPK on Monday    This visit included the following complex service elements:  Complex medical decision-making associated with antimicrobial prescribing.  In-depth chart review with high level synthesis for complex diagnoses.  Managed infection treatment protocol associated with transitions of care for this complex patient.    Ricky Lubin MD  4/11/2025  08:29 EDT

## 2025-04-11 NOTE — OUTREACH NOTE
Prep Survey      Flowsheet Row Responses   [facility] patient discharged from? [place]   Is LACE score < 7 ? Yes   Eligibility [facility]   Date of Admission 04/08/25   Date of Discharge 04/11/25   Discharge Disposition Home or Self Care   Discharge diagnosis LLE Cellulitis   Does the patient have one of the following disease processes/diagnoses(primary or secondary)? Other   Does the patient have Home health ordered? No   Is there a DME ordered? No   Comments regarding appointments daily IV abx at [facility]   Prep survey completed? Yes            Daija FLORES - Registered Nurse

## 2025-04-11 NOTE — PLAN OF CARE
Goal Outcome Evaluation:   Pt VSS. He is A+O X4. IV antibiotic continue. He is currently resting in bed with call light in reach.

## 2025-04-11 NOTE — DISCHARGE SUMMARY
[facility] Medicine Services  DISCHARGE SUMMARY    Patient Name: Carlos Simms  : 1950  MRN: [id number]    Date of Admission: 2025  7:09 PM  Date of Discharge:  2025  Primary Care Physician: Stevna Keane MD    Consults       Date and Time Order Name Status Description    2025  9:45 PM Inpatient Infectious Diseases Consult Completed             Hospital Course     Presenting Problem: Left leg swelling     Active Hospital Problems    Diagnosis  POA    **Cellulitis [L03.90]  Yes    HTN (hypertension) [I10]  Yes      Resolved Hospital Problems   No resolved problems to display.          Hospital Course:  Carlos Simms is a 74 y.o. male w/ a hx of HTN, benign essential tremor and osteoarthritis who presented to the ED w/ c/o left left swelling.      LLE Cellulitis   -developed erythema, edema around 2025   -s/p 2 rounds of antibiotics since January   -Prelim duplex report negative for DVT  -Continue Rocephin + Dapto  -ID consulted. Seen by Dr SIERRA Boss  --Labs stable   [facility] recs:  1. Have him come to our office   at 9:30 for: Daptomycin 500 mg IV daily and Rocephin 2 g IV daily  2. Appointment to see me on  at 9:30-this has been arranged   3.  CBC, CMP, ESR, CRP and CPK on Monday     HTN   -continue routine Norvasc, Lasix and Lisinopril w/ hold parameters   --stable       Patient was seen this morning in resting up in bed in no acute distress.  Hemodynamically stable and afebrile.  Has now been seen by ID and final antibiotic recommendations are in place.  He is cleared for discharge home today.  He will go to [facility] office for next IV antibiotic infusion tomorrow morning, .  Medications and follow-ups as below.      Discharge Follow Up Recommendations for outpatient labs/diagnostics:  Patient has been cleared by all services for discharge home today.  Going on medications as below with follow-ups as  noted.    --Follow-up [facility] office tomorrow, Saturday 4/12 at 9:30 AM for next IV antibiotic infusion.  -- Follow-up with Dr. Ricky Delaney with [facility] in [place] on Thursday 4/17 at 9:30 AM as scheduled  -- Follow-up PCP 1 week of discharge    Day of Discharge     HPI:   Pt was seen at 0855 am today resting up in bed in no acute distress.  Awake and alert.  States he feels okay.  Denies any pain, nausea or vomiting.  Feels his left lower extremity redness and swelling is significantly better today.  Wants to go home later today pending final ID antibiotic recommendations.    Review of Systems  Gen- No fevers, chills  CV- No chest pain, palpitations  Resp- No cough, dyspnea  GI- No N/V/D, abd pain    Vital Signs:   Temp:  [97.6 °F (36.4 °C)-98 °F (36.7 °C)] 97.7 °F (36.5 °C)  Heart Rate:  [57-79] 72  Resp:  [16] 16  BP: (100-156)/(51-80) 156/80      Physical Exam:  Constitutional: No acute distress, awake, alert.  Resting up in bed. Interactive.   HENT: NCAT, mucous membranes moist  Respiratory: Clear to auscultation bilaterally, respiratory effort normal on room air.  Sats WNL.  Cardiovascular: RRR, no murmurs, rubs, or gallops  Gastrointestinal: Positive bowel sounds, soft, nontender, nondistended  Musculoskeletal: LLE edema, erythema and warmth noted but a little better today.  No RLE edema.  BANEGAS spontaneously.  Psychiatric: Appropriate affect, cooperative and calm   Neurologic: Oriented x 3, strength symmetric in all extremities, Cranial Nerves grossly intact to confrontation, speech clear. Follows commands  Skin: per above, small wound noted between Lt 4th/5th toes     Pertinent  and/or Most Recent Results     LAB RESULTS:      Lab 04/11/25  0619 04/09/25  1031 04/08/25  2212 04/08/25  1901   WBC 9.90 7.58  --  8.27   HEMOGLOBIN 13.9 14.0  --  14.1   HEMATOCRIT 40.5 41.3  --  41.1   PLATELETS 354 356  --  348   NEUTROS ABS 5.44 4.21  --  4.92   IMMATURE GRANS (ABS) 0.06* 0.03  --  0.04   LYMPHS ABS 3.22* 2.37  --  2.31    MONOS ABS 0.74 0.64  --  0.69   EOS ABS 0.35 0.24  --  0.23   MCV 93.1 90.8  --  90.5   SED RATE  --   --   --  82*   CRP  --   --   --  2.12*   PROCALCITONIN  --   --   --  0.06   LACTATE  --   --   --  1.4   D DIMER QUANT  --   --  2.00*  --          Lab 04/09/25  1031 04/08/25  1901   SODIUM 139 137   POTASSIUM 4.1 4.8   CHLORIDE 104 101   CO2 24.0 26.0   ANION GAP 11.0 10.0   BUN 15 20   CREATININE 1.03 1.15   EGFR 75.8 66.4   GLUCOSE 100* 103*   CALCIUM 9.2 9.5         Lab 04/08/25 1901   TOTAL PROTEIN 8.0   ALBUMIN 3.8   GLOBULIN 4.2   ALT (SGPT) 17   AST (SGOT) 22   BILIRUBIN 0.4   ALK PHOS 106         Lab 04/08/25  1901   PROBNP 246.4                 Brief Urine Lab Results  (Last result in the past 365 days)        Color   Clarity   Blood   Leuk Est   Nitrite   Protein   CREAT   Urine HCG        04/08/25 2316 Yellow   Clear   Negative   Negative   Negative   Negative                 Microbiology Results (last 10 days)       Procedure Component Value - Date/Time    Blood Culture - Blood, Arm, Left [[id number]]  (Normal) Collected: 04/08/25 1924    Lab Status: Preliminary result Specimen: Blood from Arm, Left Updated: 04/10/25 1946     Blood Culture No growth at 2 days    Blood Culture - Blood, Arm, Right [[id number]]  (Normal) Collected: 04/08/25 1924    Lab Status: Preliminary result Specimen: Blood from Arm, Right Updated: 04/10/25 1946     Blood Culture No growth at 2 days            Duplex Venous Lower Extremity - LEFT  Result Date: 4/9/2025    The left lower extremity venous duplex scan is negative for evidence of DVT and SVT.     XR Foot 3+ View Left  Addendum Date: 4/8/2025  ADDENDUM #1 Please disregard the typographical error in the report. Report should read as follows: FINDINGS: There is no radiographic evidence of acute fracture or dislocation. Joint spaces are preserved. There is marked diffuse left foot soft tissue swelling. No radiopaque foreign bodies visualized. IMPRESSION: Marked diffuse  left foot soft tissue swelling. Correlate for cellulitis. No radiopaque foreign bodies visualized. No acute fracture. Discussed with Dr. Starr. Electronically Signed: Adrian Blackwood MD  4/8/2025 10:34 PM EDT  Workstation ID: [id number] ORIGINAL REPORT: XR FOOT 3+ VW LEFT Date of Exam: 4/8/2025 7:34 PM EDT Indication: Consider foreign body with cellulitis Comparison: None available. Findings: There is no radiographic evidence of acute fracture or dislocation. Joint spaces are preserved. There is marked diffuse left shoulder soft tissue swelling. No radiopaque foreign bodies visualized. Impression: Marked diffuse left shoulder soft tissue swelling. Correlate for cellulitis. No radiopaque foreign bodies visualized. No acute fracture. Electronically Signed: Adrian Blackwood MD  4/8/2025 8:09 PM EDT  Workstation ID: [id number]    Result Date: 4/8/2025  XR FOOT 3+ VW LEFT Date of Exam: 4/8/2025 7:34 PM EDT Indication: Consider foreign body with cellulitis Comparison: None available. Findings: There is no radiographic evidence of acute fracture or dislocation. Joint spaces are preserved. There is marked diffuse left shoulder soft tissue swelling. No radiopaque foreign bodies visualized.     Impression: Marked diffuse left shoulder soft tissue swelling. Correlate for cellulitis. No radiopaque foreign bodies visualized. No acute fracture. Electronically Signed: Adrian Blackwood MD  4/8/2025 8:09 PM EDT  Workstation ID: [id number]    XR Tibia Fibula 2 View Left  Result Date: 4/8/2025  XR TIBIA FIBULA 2 VW LEFT Date of Exam: 4/8/2025 7:34 PM EDT Indication: Consider foreign body with lower extremity cellulitis Comparison: None available. Findings: There is no radiographic evidence of acute fracture or dislocation. Joint spaces are preserved. Diffuse soft tissue swelling around the left tibia and fibula is visualized. No radiopaque foreign bodies visualized.     Impression: Diffuse soft tissue swelling around the left tibia and fibula.  Correlate for cellulitis. No radiopaque foreign bodies visualized. No acute fracture. Electronically Signed: Adrian Blackwood MD  4/8/2025 8:08 PM EDT  Workstation ID: [id number]      Results for orders placed during the hospital encounter of 04/08/25    Duplex Venous Lower Extremity - LEFT    Interpretation Summary    The left lower extremity venous duplex scan is negative for evidence of DVT and SVT.      Results for orders placed during the hospital encounter of 04/08/25    Duplex Venous Lower Extremity - LEFT    Interpretation Summary    The left lower extremity venous duplex scan is negative for evidence of DVT and SVT.          Plan for Follow-up of Pending Labs/Results:   Pending Labs       Order Current Status    Blood Culture - Blood, Arm, Left Preliminary result    Blood Culture - Blood, Arm, Right Preliminary result          Discharge Details        Discharge Medications        New Medications        Instructions Start Date   acetaminophen 325 MG tablet  Commonly known as: TYLENOL   650 mg, Oral, Every 4 Hours PRN      cefTRIAXone 2,000 mg in sodium chloride 0.9 % 100 mL IVPB   2,000 mg, Intravenous, Every 24 Hours   Start Date: April 12, 2025     daptomycin solution IVPB  Commonly known as: CUBICIN   500 mg, Intravenous, Every 24 Hours   Start Date: April 12, 2025     polyethylene glycol 17 g packet  Commonly known as: MIRALAX   17 g, Oral, Daily PRN             Continue These Medications        Instructions Start Date   amLODIPine 5 MG tablet  Commonly known as: NORVASC   5 mg, Daily      furosemide 40 MG tablet  Commonly known as: LASIX   40 mg, Daily      lisinopril 40 MG tablet  Commonly known as: PRINIVIL,ZESTRIL   40 mg, Daily               Allergies   Allergen Reactions    Latex Rash         Discharge Disposition:  Home or Self Care    Diet:  Hospital:  Diet Order   Procedures    Diet: Regular/House, Cardiac; Healthy Heart (2-3 Na+); Fluid Consistency: Thin (IDDSI 0)       Diet Instructions        Diet: Cardiac Diets; Healthy Heart (2-3 Na+); Regular (IDDSI 7); Thin (IDDSI 0)      Discharge Diet: Cardiac Diets    Cardiac Diet: Healthy Heart (2-3 Na+)    Texture: Regular (IDDSI 7)    Fluid Consistency: Thin (IDDSI 0)             Activity:  Activity Instructions       Activity as Tolerated      Measure Blood Pressure              Restrictions or Other Recommendations:         CODE STATUS:    Code Status and Medical Interventions: CPR (Attempt to Resuscitate); Full Support   Ordered at: 04/08/25 2127     Code Status (Patient has no pulse and is not breathing):    CPR (Attempt to Resuscitate)     Medical Interventions (Patient has pulse or is breathing):    Full Support       No future appointments.    Additional Instructions for the Follow-ups that You Need to Schedule       Discharge Follow-up with PCP   As directed       Currently Documented PCP:    Stevan Keane MD    PCP Phone Number:    [phone]     Follow Up Details: Follow-up PCP 1 week of discharge (please schedule appointment prior to DC)        Discharge Follow-up with Specialty: Follow-up [facility] office for next IV infusion of antibiotics tomorrow morning Saturday 4/12 at 9:30 AM   As directed      Specialty: Follow-up [facility] office for next IV infusion of antibiotics tomorrow morning Saturday 4/12 at 9:30 AM        Discharge Follow-up with Specified Provider: Follow-up with Dr. Ricky Boss as scheduled on 4/17/2025 at 9:30 AM   As directed      To: Follow-up with Dr. Ricky Boss as scheduled on 4/17/2025 at 9:30 AM                      KATI Goff  04/11/25      Time Spent on Discharge:  I spent 40 minutes on this discharge activity which included: face-to-face encounter with the patient, reviewing the data in the system, coordination of the care with the nursing staff as well as consultants, documentation, and entering orders.

## 2025-04-11 NOTE — CASE MANAGEMENT/SOCIAL WORK
Continued Stay Note  [facility]     Patient Name: Carlos Simms  MRN: [id number]  Today's Date: 4/11/2025    Admit Date: 4/8/2025    Plan: Home   Discharge Plan       Row Name 04/11/25 1204       Plan    Plan Home    Patient/Family in Agreement with Plan other (see comments)    Plan Comments Pt is being discharged home today. Pt will be having IV antibiotics at [facility] office starting tomorrow at 0930. Family to transport at discharge. No other needs voiced or identified.    Final Discharge Disposition Code 01 - home or self-care                   Discharge Codes    No documentation.                 Expected Discharge Date and Time       Expected Discharge Date Expected Discharge Time    Apr 11, 2025               Arcelia Carlson RN

## 2025-04-13 LAB
BACTERIA SPEC AEROBE CULT: NORMAL
BACTERIA SPEC AEROBE CULT: NORMAL

## 2025-04-14 ENCOUNTER — TRANSITIONAL CARE MANAGEMENT TELEPHONE ENCOUNTER (OUTPATIENT)
Dept: CALL CENTER | Facility: HOSPITAL | Age: 75
End: 2025-04-14
Payer: MEDICARE

## 2025-04-14 ENCOUNTER — TRANSCRIBE ORDERS (OUTPATIENT)
Dept: LAB | Facility: HOSPITAL | Age: 75
End: 2025-04-14
Payer: MEDICARE

## 2025-04-14 ENCOUNTER — LAB (OUTPATIENT)
Dept: LAB | Facility: HOSPITAL | Age: 75
End: 2025-04-14
Payer: MEDICARE

## 2025-04-14 DIAGNOSIS — Z79.2 LONG TERM CURRENT USE OF ANTIBIOTICS: ICD-10-CM

## 2025-04-14 DIAGNOSIS — Z79.2 LONG TERM CURRENT USE OF ANTIBIOTICS: Primary | ICD-10-CM

## 2025-04-14 LAB
ALBUMIN SERPL-MCNC: 3.8 G/DL (ref 3.5–5.2)
ALBUMIN/GLOB SERPL: 1 G/DL
ALP SERPL-CCNC: 96 U/L (ref 39–117)
ALT SERPL W P-5'-P-CCNC: 25 U/L (ref 1–41)
ANION GAP SERPL CALCULATED.3IONS-SCNC: 11 MMOL/L (ref 5–15)
AST SERPL-CCNC: 26 U/L (ref 1–40)
BASOPHILS # BLD AUTO: 0.1 10*3/MM3 (ref 0–0.2)
BASOPHILS NFR BLD AUTO: 1.1 % (ref 0–1.5)
BILIRUB SERPL-MCNC: 0.2 MG/DL (ref 0–1.2)
BUN SERPL-MCNC: 19 MG/DL (ref 8–23)
BUN/CREAT SERPL: 19.8 (ref 7–25)
CALCIUM SPEC-SCNC: 9.3 MG/DL (ref 8.6–10.5)
CHLORIDE SERPL-SCNC: 104 MMOL/L (ref 98–107)
CK SERPL-CCNC: 115 U/L (ref 20–200)
CO2 SERPL-SCNC: 25 MMOL/L (ref 22–29)
CREAT SERPL-MCNC: 0.96 MG/DL (ref 0.76–1.27)
CRP SERPL-MCNC: 0.74 MG/DL (ref 0–0.5)
DEPRECATED RDW RBC AUTO: 45.1 FL (ref 37–54)
EGFRCR SERPLBLD CKD-EPI 2021: 82.4 ML/MIN/1.73
EOSINOPHIL # BLD AUTO: 0.29 10*3/MM3 (ref 0–0.4)
EOSINOPHIL NFR BLD AUTO: 3.3 % (ref 0.3–6.2)
ERYTHROCYTE [DISTWIDTH] IN BLOOD BY AUTOMATED COUNT: 13.2 % (ref 12.3–15.4)
ERYTHROCYTE [SEDIMENTATION RATE] IN BLOOD: 47 MM/HR (ref 0–20)
GLOBULIN UR ELPH-MCNC: 3.9 GM/DL
GLUCOSE SERPL-MCNC: 110 MG/DL (ref 65–99)
HCT VFR BLD AUTO: 42.2 % (ref 37.5–51)
HGB BLD-MCNC: 14.1 G/DL (ref 13–17.7)
IMM GRANULOCYTES # BLD AUTO: 0.05 10*3/MM3 (ref 0–0.05)
IMM GRANULOCYTES NFR BLD AUTO: 0.6 % (ref 0–0.5)
LYMPHOCYTES # BLD AUTO: 2.5 10*3/MM3 (ref 0.7–3.1)
LYMPHOCYTES NFR BLD AUTO: 28 % (ref 19.6–45.3)
MCH RBC QN AUTO: 31.3 PG (ref 26.6–33)
MCHC RBC AUTO-ENTMCNC: 33.4 G/DL (ref 31.5–35.7)
MCV RBC AUTO: 93.6 FL (ref 79–97)
MONOCYTES # BLD AUTO: 0.71 10*3/MM3 (ref 0.1–0.9)
MONOCYTES NFR BLD AUTO: 8 % (ref 5–12)
NEUTROPHILS NFR BLD AUTO: 5.27 10*3/MM3 (ref 1.7–7)
NEUTROPHILS NFR BLD AUTO: 59 % (ref 42.7–76)
NRBC BLD AUTO-RTO: 0 /100 WBC (ref 0–0.2)
PLATELET # BLD AUTO: 374 10*3/MM3 (ref 140–450)
PMV BLD AUTO: 9.2 FL (ref 6–12)
POTASSIUM SERPL-SCNC: 4.5 MMOL/L (ref 3.5–5.2)
PROT SERPL-MCNC: 7.7 G/DL (ref 6–8.5)
RBC # BLD AUTO: 4.51 10*6/MM3 (ref 4.14–5.8)
SODIUM SERPL-SCNC: 140 MMOL/L (ref 136–145)
WBC NRBC COR # BLD AUTO: 8.92 10*3/MM3 (ref 3.4–10.8)

## 2025-04-14 PROCEDURE — 85025 COMPLETE CBC W/AUTO DIFF WBC: CPT

## 2025-04-14 PROCEDURE — 86140 C-REACTIVE PROTEIN: CPT

## 2025-04-14 PROCEDURE — 85652 RBC SED RATE AUTOMATED: CPT

## 2025-04-14 PROCEDURE — 82550 ASSAY OF CK (CPK): CPT

## 2025-04-14 PROCEDURE — 36415 COLL VENOUS BLD VENIPUNCTURE: CPT

## 2025-04-14 PROCEDURE — 80053 COMPREHEN METABOLIC PANEL: CPT

## 2025-04-14 NOTE — OUTREACH NOTE
Call Center TCM Note      Flowsheet Row Responses   [facility] patient discharged from? [place]   Does the patient have one of the following disease processes/diagnoses(primary or secondary)? Other   TCM attempt successful? No   Unsuccessful attempts Attempt 2            BABAK WASSERMAN - Registered Nurse    4/14/2025, 14:21 EDT

## 2025-04-14 NOTE — OUTREACH NOTE
Call Center TCM Note      Flowsheet Row Responses   [facility] patient discharged from? [place]   Does the patient have one of the following disease processes/diagnoses(primary or secondary)? Other   TCM attempt successful? No   Unsuccessful attempts Attempt 1            BABAK WASSERMAN - Registered Nurse    4/14/2025, 12:33 EDT

## 2025-04-15 ENCOUNTER — TRANSITIONAL CARE MANAGEMENT TELEPHONE ENCOUNTER (OUTPATIENT)
Dept: CALL CENTER | Facility: HOSPITAL | Age: 75
End: 2025-04-15
Payer: MEDICARE

## 2025-04-15 NOTE — OUTREACH NOTE
Call Center TCM Note      Flowsheet Row Responses   [facility] patient discharged from? [place]   Does the patient have one of the following disease processes/diagnoses(primary or secondary)? Other   TCM attempt successful? Yes   Call start time 1010   Call end time 1019   Discharge diagnosis LLE Cellulitis   Is patient permission given to speak with other caregiver? Yes   List who call center can speak with Hilda   Person spoke with today (if not patient) and relationship Hilda   Medication alerts for this patient IV antibiotic infusions daily at [facility] per wife's report.   Meds reviewed with patient/caregiver? Yes   Is the patient having any side effects they believe may be caused by any medication additions or changes? No   Does the patient have all medications ordered at discharge? Yes   Is the patient taking all medications as directed (includes completed medication regime)? Yes   Comments 4/25/2025 10:00 AM  HOSPITAL FOLLOW UP   Does the patient have an appointment with their PCP within 7-14 days of discharge? Yes   Comments Left leg remains with no improvement, red, warm and firm the patient's wife reports. The patient has no pain, fever or chills, per Hilda's reporting. The pt is tolerating po intake, voiding WNL without N/V/D, per wife's report. Patient elevating LLE when seated, per wife. The wife reports concern over no change in pt's leg, this nurse recommended having staff assess LLE at infusion tomorrow and/or call [facility] MD with the wife's concerns.   Did the patient receive a copy of their discharge instructions? Yes   Nursing interventions Reviewed instructions with patient   What is the patient's perception of their health status since discharge? Same   Is the patient/caregiver able to teach back signs and symptoms related to disease process for when to call PCP? Yes   Is the patient/caregiver able to teach back signs and symptoms related to disease process for when to call 911? Yes   Is the  patient/caregiver able to teach back the hierarchy of who to call/visit for symptoms/problems? PCP, Specialist, Home health nurse, Urgent Care, ED, 911 Yes   TCM call completed? Yes   Call end time 1019            Andreea Deng Registered Nurse    4/15/2025, 10:19 EDT

## 2025-04-25 ENCOUNTER — TELEPHONE (OUTPATIENT)
Dept: FAMILY MEDICINE CLINIC | Facility: CLINIC | Age: 75
End: 2025-04-25

## 2025-04-25 ENCOUNTER — OFFICE VISIT (OUTPATIENT)
Dept: FAMILY MEDICINE CLINIC | Facility: CLINIC | Age: 75
End: 2025-04-25
Payer: MEDICARE

## 2025-04-25 VITALS
WEIGHT: 199 LBS | DIASTOLIC BLOOD PRESSURE: 82 MMHG | BODY MASS INDEX: 31.23 KG/M2 | HEART RATE: 77 BPM | HEIGHT: 67 IN | SYSTOLIC BLOOD PRESSURE: 120 MMHG | OXYGEN SATURATION: 93 %

## 2025-04-25 DIAGNOSIS — I87.2 VENOUS INSUFFICIENCY OF LEFT LEG: ICD-10-CM

## 2025-04-25 DIAGNOSIS — I10 PRIMARY HYPERTENSION: ICD-10-CM

## 2025-04-25 DIAGNOSIS — B35.3 TINEA PEDIS OF LEFT FOOT: ICD-10-CM

## 2025-04-25 DIAGNOSIS — L03.116 CELLULITIS OF LEFT LOWER EXTREMITY: Primary | ICD-10-CM

## 2025-04-25 RX ORDER — LISINOPRIL 40 MG/1
40 TABLET ORAL DAILY
Qty: 90 TABLET | Refills: 1 | Status: SHIPPED | OUTPATIENT
Start: 2025-04-25

## 2025-04-25 RX ORDER — FLUCONAZOLE 200 MG/1
1 TABLET ORAL WEEKLY
COMMUNITY
Start: 2025-04-17

## 2025-04-25 RX ORDER — FUROSEMIDE 40 MG/1
40 TABLET ORAL DAILY PRN
Qty: 90 TABLET | Refills: 1 | Status: SHIPPED | OUTPATIENT
Start: 2025-04-25

## 2025-04-25 RX ORDER — AMLODIPINE BESYLATE 5 MG/1
5 TABLET ORAL DAILY
Qty: 90 TABLET | Refills: 2 | Status: SHIPPED | OUTPATIENT
Start: 2025-04-25

## 2025-04-25 NOTE — TELEPHONE ENCOUNTER
Please get clinic notes from Dr Ricky Lubin, with [facility]     Spoke with Will at Dr. Tristian barlowg over now

## 2025-04-26 PROBLEM — I87.2 VENOUS INSUFFICIENCY OF LEFT LEG: Status: ACTIVE | Noted: 2025-04-26

## 2025-04-26 PROBLEM — B35.3 TINEA PEDIS OF LEFT FOOT: Status: ACTIVE | Noted: 2025-04-26

## 2025-04-27 NOTE — PROGRESS NOTES
Transitional Care Follow Up Visit  Subjective     Carlos Simms is a 74 y.o. male who presents for a transitional care management visit.    Within 48 business hours after discharge our office contacted him via telephone to coordinate his care and needs.      I reviewed and discussed the details of that call along with the discharge summary, hospital problems, inpatient lab results, inpatient diagnostic studies, and consultation reports with Carlos.     Current outpatient and discharge medications have been reconciled for the patient.  Reviewed by: Stevan Keane MD          4/11/2025     6:33 PM   Date of TCM Phone Call   [facility]   Date of Admission 4/8/2025   Date of Discharge 4/11/2025   Discharge Disposition Home or Self Care     Risk for Readmission (LACE) Score: 6 (4/11/2025  6:00 AM)      History of Present Illness   Course During Hospital Stay: Patient was admitted to [facility] on April 8, 2025 and discharged on April 11, 2025.  He was contacted by our staff for TCM twice on April 14, 2025 and once on April 15, 2025.  Reason for admission was treatment refractory cellulitis of the left lower extremity.  Patient was admitted and infectious disease was consulted, and treated the patient with Rocephin and daptomycin.  He has been to see infectious disease and follow-up in clinic in completed his course of IV antibiotics, and is now finishing up a course of Augmentin.  He is also treating his tinea pedis with Lotrimin ultra over-the-counter and weekly Diflucan for 4 weeks.  He is feeling much better, and the redness and swelling in the left leg are almost completely resolved.     The following portions of the patient's history were reviewed and updated as appropriate: allergies, current medications, past family history, past medical history, past social history, past surgical history, and problem list.    Review of Systems   Constitutional:  Negative for appetite  "change, chills and fever.   HENT:  Negative for sore throat and trouble swallowing.    Respiratory:  Negative for cough and shortness of breath.    Cardiovascular:  Negative for chest pain and palpitations.   Gastrointestinal:  Negative for abdominal pain, blood in stool, diarrhea, nausea and vomiting.   Endocrine: Negative for polydipsia, polyphagia and polyuria.   Genitourinary:  Negative for dysuria and hematuria.   Musculoskeletal:  Negative for back pain, joint swelling and myalgias.   Skin:  Negative for wound.   Neurological:  Negative for seizures, syncope and headaches.   Hematological:  Negative for adenopathy.   Psychiatric/Behavioral:  Negative for dysphoric mood. The patient is not nervous/anxious.        Objective   /82 (BP Location: Left arm, Patient Position: Sitting, Cuff Size: Adult)   Pulse 77   Ht 170.2 cm (67\")   Wt 90.3 kg (199 lb)   SpO2 93%   BMI 31.17 kg/m²   Physical Exam  Constitutional:       General: He is not in acute distress.     Appearance: He is obese. He is not toxic-appearing.   HENT:      Head: Normocephalic and atraumatic.      Right Ear: Ear canal and external ear normal.      Left Ear: Ear canal and external ear normal.      Nose: Nose normal.      Mouth/Throat:      Mouth: Mucous membranes are moist.      Pharynx: Oropharynx is clear.   Eyes:      General: No scleral icterus.     Extraocular Movements: Extraocular movements intact.      Conjunctiva/sclera: Conjunctivae normal.      Pupils: Pupils are equal, round, and reactive to light.   Neck:      Vascular: No carotid bruit.   Cardiovascular:      Rate and Rhythm: Normal rate and regular rhythm.      Pulses: Normal pulses.      Heart sounds: Normal heart sounds.   Pulmonary:      Effort: Pulmonary effort is normal.      Breath sounds: Normal breath sounds.   Chest:      Chest wall: No tenderness.   Abdominal:      General: Bowel sounds are normal. There is no distension.      Palpations: Abdomen is soft.      " Tenderness: There is no abdominal tenderness. There is no guarding or rebound.   Musculoskeletal:         General: No swelling or deformity. Normal range of motion.      Cervical back: Normal range of motion. No rigidity.      Right lower leg: No edema.      Left lower leg: Edema (Trace pitting foot only) present.   Lymphadenopathy:      Cervical: No cervical adenopathy.   Skin:     General: Skin is warm and dry.      Capillary Refill: Capillary refill takes less than 2 seconds.      Coloration: Skin is not jaundiced or pale.      Findings: No bruising, erythema or rash.   Neurological:      General: No focal deficit present.      Mental Status: He is alert and oriented to person, place, and time.      Cranial Nerves: No cranial nerve deficit.      Motor: No weakness.      Coordination: Coordination normal.      Gait: Gait normal.   Psychiatric:         Mood and Affect: Mood normal.         Behavior: Behavior normal.         Thought Content: Thought content normal.         Judgment: Judgment normal.         Assessment & Plan   Problems Addressed this Visit          Cardiac and Vasculature    HTN (hypertension)    Relevant Medications    amLODIPine (NORVASC) 5 MG tablet    furosemide (LASIX) 40 MG tablet    lisinopril (PRINIVIL,ZESTRIL) 40 MG tablet    Venous insufficiency of left leg       Infectious Diseases    Cellulitis - Primary       Skin    Tinea pedis of left foot    Relevant Medications    fluconazole (DIFLUCAN) 200 MG tablet     Diagnoses         Codes Comments      Cellulitis of left lower extremity    -  Primary ICD-10-CM: L03.116  ICD-9-CM: 682.6       Tinea pedis of left foot     ICD-10-CM: B35.3  ICD-9-CM: 110.4       Primary hypertension     ICD-10-CM: I10  ICD-9-CM: 401.9       Venous insufficiency of left leg     ICD-10-CM: I87.2  ICD-9-CM: 459.81           Moderately complex medical decision making.  Patient is significantly improved and will finish his medications and follow-up with infectious  disease as advised.  He will continue his current blood pressure medicines and Lasix as needed.    The patient was originally seen by me for the first time in 2023, and then was lost to follow-up until the time of his recent hospitalization.  We will need to get caught up on the events of the past 2 years and make sure that he is up-to-date on his preventative care and other medical concerns, so he will schedule a follow-up exam and annual wellness visit at a future date.  I will send him a letter to ask if he needs any assistance in getting reestablished with urology, and explaining that I never got the results of the Cologuard test that was ordered 2 years ago.  Patient will be encouraged to contact me if these things need to be addressed before his next appointment is scheduled.